# Patient Record
Sex: FEMALE | Race: WHITE | Employment: UNEMPLOYED | URBAN - METROPOLITAN AREA
[De-identification: names, ages, dates, MRNs, and addresses within clinical notes are randomized per-mention and may not be internally consistent; named-entity substitution may affect disease eponyms.]

---

## 2017-09-26 PROBLEM — Z80.0 FAMILY HISTORY OF COLON CANCER IN FATHER: Status: ACTIVE | Noted: 2017-09-26

## 2017-09-26 PROCEDURE — 86812 HLA TYPING A B OR C: CPT | Performed by: FAMILY MEDICINE

## 2017-09-26 PROCEDURE — 83002 ASSAY OF GONADOTROPIN (LH): CPT | Performed by: FAMILY MEDICINE

## 2017-09-26 PROCEDURE — 83001 ASSAY OF GONADOTROPIN (FSH): CPT | Performed by: FAMILY MEDICINE

## 2017-09-26 PROCEDURE — 82670 ASSAY OF TOTAL ESTRADIOL: CPT | Performed by: FAMILY MEDICINE

## 2017-10-30 PROBLEM — F17.200 SMOKER: Status: ACTIVE | Noted: 2017-10-30

## 2017-11-01 PROBLEM — Z72.0 TOBACCO USE: Status: ACTIVE | Noted: 2017-11-01

## 2017-11-01 PROBLEM — I50.30 DIASTOLIC HEART FAILURE, UNSPECIFIED HEART FAILURE CHRONICITY: Status: ACTIVE | Noted: 2017-11-01

## 2017-11-10 PROCEDURE — 87340 HEPATITIS B SURFACE AG IA: CPT | Performed by: INTERNAL MEDICINE

## 2017-11-10 PROCEDURE — 86803 HEPATITIS C AB TEST: CPT | Performed by: INTERNAL MEDICINE

## 2017-11-10 PROCEDURE — 86704 HEP B CORE ANTIBODY TOTAL: CPT | Performed by: INTERNAL MEDICINE

## 2017-11-10 PROCEDURE — 86160 COMPLEMENT ANTIGEN: CPT | Performed by: INTERNAL MEDICINE

## 2017-11-10 PROCEDURE — 86200 CCP ANTIBODY: CPT | Performed by: INTERNAL MEDICINE

## 2017-11-10 PROCEDURE — 36415 COLL VENOUS BLD VENIPUNCTURE: CPT | Performed by: INTERNAL MEDICINE

## 2018-05-14 ENCOUNTER — HOSPITAL ENCOUNTER (EMERGENCY)
Facility: HOSPITAL | Age: 48
Discharge: HOME | End: 2018-05-14
Payer: COMMERCIAL

## 2018-05-14 ENCOUNTER — APPOINTMENT (OUTPATIENT)
Dept: GENERAL RADIOLOGY | Facility: HOSPITAL | Age: 48
End: 2018-05-14
Payer: COMMERCIAL

## 2018-05-14 VITALS
OXYGEN SATURATION: 96 % | RESPIRATION RATE: 18 BRPM | BODY MASS INDEX: 28.35 KG/M2 | HEIGHT: 63 IN | WEIGHT: 160 LBS | HEART RATE: 83 BPM | DIASTOLIC BLOOD PRESSURE: 80 MMHG | SYSTOLIC BLOOD PRESSURE: 142 MMHG | TEMPERATURE: 100 F

## 2018-05-14 DIAGNOSIS — R11.2 NON-INTRACTABLE VOMITING WITH NAUSEA, UNSPECIFIED VOMITING TYPE: ICD-10-CM

## 2018-05-14 DIAGNOSIS — S70.01XA CONTUSION OF RIGHT HIP, INITIAL ENCOUNTER: ICD-10-CM

## 2018-05-14 DIAGNOSIS — K59.00 CONSTIPATION, UNSPECIFIED CONSTIPATION TYPE: Primary | ICD-10-CM

## 2018-05-14 PROCEDURE — 99283 EMERGENCY DEPT VISIT LOW MDM: CPT

## 2018-05-14 PROCEDURE — 73502 X-RAY EXAM HIP UNI 2-3 VIEWS: CPT

## 2018-05-14 RX ORDER — ONDANSETRON 4 MG/1
4 TABLET, ORALLY DISINTEGRATING ORAL EVERY 4 HOURS PRN
Qty: 20 TABLET | Refills: 0 | Status: SHIPPED | OUTPATIENT
Start: 2018-05-14 | End: 2019-05-24

## 2018-05-14 RX ORDER — ONDANSETRON 4 MG/1
4 TABLET, ORALLY DISINTEGRATING ORAL ONCE
Status: COMPLETED | OUTPATIENT
Start: 2018-05-14 | End: 2018-05-14

## 2018-05-14 NOTE — ED INITIAL ASSESSMENT (HPI)
Patient reports right hip pain since falling backward last week. Patient denies loss of consciousness or head injury. The patient is complaining of constipation for 3 days with diarrhea a few minutes ago and active nausea and vomiting.  Patient denies blood

## 2018-05-15 NOTE — ED PROVIDER NOTES
Patient Seen in: [facility] AND [facility] Emergency Department    History   Patient presents with:  Nausea/Vomiting/Diarrhea (gastrointestinal)  Pain (neurologic)      HPI    Patient presents to the ED complaining of right hip pain ever since falling last week. Cancer Other      m aunt hodgkins lymph  at 36 (dx at 28)   • Other [OTHER] Other      p aunt with ra or lupus   • No Known Problems Son    • Alcohol and Other Disorders Associated Sister    • No Known Problems Brother        Smoking Status: Social His Reviewed - No data to display      Imaging Results Available and Reviewed while in ED: Xr Hip W Or Wo Pelvis 2 Or 3 Views, Right (cpt=73502)    Result Date: 5/14/2018  CONCLUSION:   No radiographically visible acute osseous injury of the right hip.   Ancil Lower presents to the ED complaining of right hip pain after fall last week. Has been ambulatory despite the pain, x-ray unremarkable for acute bony injury. Likely hip contusion, discussed supportive care measures and PMD follow-up. Constipation ×3 days.   Eva Whiting

## 2018-05-15 NOTE — ED NOTES
Pt dcd to home with son, pt refuse to sign discharge paper but took the prescription and discharge instruction, discharge instruction given and voices understanding, prescription given, pt uses her cane on her way to ER lobby, NAD noted

## 2018-08-17 PROBLEM — I50.30 DIASTOLIC HEART FAILURE (HCC): Status: ACTIVE | Noted: 2017-11-01

## 2018-10-09 PROCEDURE — 88175 CYTOPATH C/V AUTO FLUID REDO: CPT | Performed by: FAMILY MEDICINE

## 2018-10-09 PROCEDURE — 87624 HPV HI-RISK TYP POOLED RSLT: CPT | Performed by: FAMILY MEDICINE

## 2018-10-12 PROCEDURE — 86200 CCP ANTIBODY: CPT | Performed by: INTERNAL MEDICINE

## 2019-09-05 PROBLEM — R76.8 RHEUMATOID FACTOR POSITIVE: Status: ACTIVE | Noted: 2019-09-05

## 2019-09-05 PROBLEM — M05.79 RHEUMATOID ARTHRITIS INVOLVING MULTIPLE SITES WITH POSITIVE RHEUMATOID FACTOR (HCC): Status: ACTIVE | Noted: 2019-09-05

## 2019-09-05 PROBLEM — R76.8 ANA POSITIVE: Status: ACTIVE | Noted: 2019-09-05

## 2019-11-19 PROBLEM — R76.8 POSITIVE ANA (ANTINUCLEAR ANTIBODY): Status: ACTIVE | Noted: 2019-09-05

## 2019-11-19 PROBLEM — M17.11 PRIMARY OSTEOARTHRITIS OF RIGHT KNEE: Status: ACTIVE | Noted: 2019-11-19

## 2020-06-24 ENCOUNTER — HOSPITAL ENCOUNTER (INPATIENT)
Facility: HOSPITAL | Age: 50
LOS: 6 days | Discharge: HOME | DRG: 391 | End: 2020-06-30
Attending: EMERGENCY MEDICINE | Admitting: HOSPITALIST
Payer: MEDICAID

## 2020-06-24 ENCOUNTER — APPOINTMENT (OUTPATIENT)
Dept: CT IMAGING | Facility: HOSPITAL | Age: 50
DRG: 391 | End: 2020-06-24
Attending: EMERGENCY MEDICINE
Payer: MEDICAID

## 2020-06-24 DIAGNOSIS — E87.1 HYPONATREMIA: ICD-10-CM

## 2020-06-24 DIAGNOSIS — R62.7 FAILURE TO THRIVE IN ADULT: ICD-10-CM

## 2020-06-24 DIAGNOSIS — E87.6 HYPOKALEMIA: Primary | ICD-10-CM

## 2020-06-24 DIAGNOSIS — N17.9 AKI (ACUTE KIDNEY INJURY) (HCC): ICD-10-CM

## 2020-06-24 LAB
ALBUMIN SERPL-MCNC: 3.2 G/DL (ref 3.4–5)
ALBUMIN/GLOB SERPL: 0.7 {RATIO} (ref 1–2)
ALP LIVER SERPL-CCNC: 63 U/L (ref 39–100)
ALT SERPL-CCNC: 50 U/L (ref 13–56)
ANION GAP SERPL CALC-SCNC: 11 MMOL/L (ref 0–18)
AST SERPL-CCNC: 43 U/L (ref 15–37)
BASOPHILS # BLD AUTO: 0.06 X10(3) UL (ref 0–0.2)
BASOPHILS NFR BLD AUTO: 1.1 %
BILIRUB SERPL-MCNC: 0.7 MG/DL (ref 0.1–2)
BUN BLD-MCNC: 52 MG/DL (ref 7–18)
BUN/CREAT SERPL: 20.9 (ref 10–20)
C DIFF TOX B STL QL: NEGATIVE
CALCIUM BLD-MCNC: 10.5 MG/DL (ref 8.5–10.1)
CHLORIDE SERPL-SCNC: 92 MMOL/L (ref 98–112)
CO2 SERPL-SCNC: 26 MMOL/L (ref 21–32)
CORTIS SERPL-MCNC: 45.2 UG/DL
CREAT BLD-MCNC: 2.49 MG/DL (ref 0.55–1.02)
CRP SERPL-MCNC: 1.4 MG/DL (ref ?–0.3)
DEPRECATED HBV CORE AB SER IA-ACNC: 572.2 NG/ML (ref 12–240)
DEPRECATED RDW RBC AUTO: 43.2 FL (ref 35.1–46.3)
EOSINOPHIL # BLD AUTO: 0.17 X10(3) UL (ref 0–0.7)
EOSINOPHIL NFR BLD AUTO: 3 %
ERYTHROCYTE [DISTWIDTH] IN BLOOD BY AUTOMATED COUNT: 14.2 % (ref 11–15)
GLOBULIN PLAS-MCNC: 4.3 G/DL (ref 2.8–4.4)
GLUCOSE BLD-MCNC: 126 MG/DL (ref 70–99)
HAV IGM SER QL: 2.6 MG/DL (ref 1.6–2.6)
HCT VFR BLD AUTO: 31.2 % (ref 35–48)
HGB BLD-MCNC: 10.7 G/DL (ref 12–16)
IMM GRANULOCYTES # BLD AUTO: 0.02 X10(3) UL (ref 0–1)
IMM GRANULOCYTES NFR BLD: 0.4 %
LDH SERPL L TO P-CCNC: 404 U/L
LYMPHOCYTES # BLD AUTO: 0.71 X10(3) UL (ref 1–4)
LYMPHOCYTES NFR BLD AUTO: 12.5 %
M PROTEIN MFR SERPL ELPH: 7.5 G/DL (ref 6.4–8.2)
MCH RBC QN AUTO: 28.9 PG (ref 26–34)
MCHC RBC AUTO-ENTMCNC: 34.3 G/DL (ref 31–37)
MCV RBC AUTO: 84.3 FL (ref 80–100)
MONOCYTES # BLD AUTO: 0.63 X10(3) UL (ref 0.1–1)
MONOCYTES NFR BLD AUTO: 11.1 %
NEUTROPHILS # BLD AUTO: 4.1 X10 (3) UL (ref 1.5–7.7)
NEUTROPHILS # BLD AUTO: 4.1 X10(3) UL (ref 1.5–7.7)
NEUTROPHILS NFR BLD AUTO: 71.9 %
OSMOLALITY SERPL CALC.SUM OF ELEC: 284 MOSM/KG (ref 275–295)
PLATELET # BLD AUTO: 273 10(3)UL (ref 150–450)
POTASSIUM SERPL-SCNC: 2.4 MMOL/L (ref 3.5–5.1)
PROCALCITONIN SERPL-MCNC: 0.36 NG/ML (ref ?–0.16)
RBC # BLD AUTO: 3.7 X10(6)UL (ref 3.8–5.3)
SARS-COV-2 RNA RESP QL NAA+PROBE: NOT DETECTED
SODIUM SERPL-SCNC: 129 MMOL/L (ref 136–145)
WBC # BLD AUTO: 5.7 X10(3) UL (ref 4–11)

## 2020-06-24 PROCEDURE — 74176 CT ABD & PELVIS W/O CONTRAST: CPT | Performed by: EMERGENCY MEDICINE

## 2020-06-24 PROCEDURE — 85025 COMPLETE CBC W/AUTO DIFF WBC: CPT | Performed by: EMERGENCY MEDICINE

## 2020-06-24 PROCEDURE — 96361 HYDRATE IV INFUSION ADD-ON: CPT

## 2020-06-24 PROCEDURE — 83735 ASSAY OF MAGNESIUM: CPT | Performed by: EMERGENCY MEDICINE

## 2020-06-24 PROCEDURE — 93010 ELECTROCARDIOGRAM REPORT: CPT | Performed by: EMERGENCY MEDICINE

## 2020-06-24 PROCEDURE — 87493 C DIFF AMPLIFIED PROBE: CPT | Performed by: HOSPITALIST

## 2020-06-24 PROCEDURE — 84145 PROCALCITONIN (PCT): CPT | Performed by: EMERGENCY MEDICINE

## 2020-06-24 PROCEDURE — 93005 ELECTROCARDIOGRAM TRACING: CPT

## 2020-06-24 PROCEDURE — 82533 TOTAL CORTISOL: CPT | Performed by: INTERNAL MEDICINE

## 2020-06-24 PROCEDURE — 99291 CRITICAL CARE FIRST HOUR: CPT

## 2020-06-24 PROCEDURE — 83615 LACTATE (LD) (LDH) ENZYME: CPT | Performed by: EMERGENCY MEDICINE

## 2020-06-24 PROCEDURE — 82728 ASSAY OF FERRITIN: CPT | Performed by: EMERGENCY MEDICINE

## 2020-06-24 PROCEDURE — 80053 COMPREHEN METABOLIC PANEL: CPT | Performed by: EMERGENCY MEDICINE

## 2020-06-24 PROCEDURE — 96375 TX/PRO/DX INJ NEW DRUG ADDON: CPT

## 2020-06-24 PROCEDURE — 96374 THER/PROPH/DIAG INJ IV PUSH: CPT

## 2020-06-24 PROCEDURE — 86140 C-REACTIVE PROTEIN: CPT | Performed by: EMERGENCY MEDICINE

## 2020-06-24 RX ORDER — DICYCLOMINE HYDROCHLORIDE 10 MG/1
10 CAPSULE ORAL 3 TIMES DAILY PRN
Status: DISCONTINUED | OUTPATIENT
Start: 2020-06-24 | End: 2020-06-30

## 2020-06-24 RX ORDER — MONTELUKAST SODIUM 10 MG/1
10 TABLET ORAL NIGHTLY
Status: DISCONTINUED | OUTPATIENT
Start: 2020-06-24 | End: 2020-06-30

## 2020-06-24 RX ORDER — ACETAMINOPHEN 325 MG/1
650 TABLET ORAL EVERY 6 HOURS PRN
Status: DISCONTINUED | OUTPATIENT
Start: 2020-06-24 | End: 2020-06-30

## 2020-06-24 RX ORDER — PANTOPRAZOLE SODIUM 40 MG/1
40 TABLET, DELAYED RELEASE ORAL
Status: DISCONTINUED | OUTPATIENT
Start: 2020-06-25 | End: 2020-06-26

## 2020-06-24 RX ORDER — BUPRENORPHINE HYDROCHLORIDE 8 MG/1
8 TABLET SUBLINGUAL 3 TIMES DAILY PRN
Status: DISCONTINUED | OUTPATIENT
Start: 2020-06-24 | End: 2020-06-30

## 2020-06-24 RX ORDER — SODIUM CHLORIDE 9 MG/ML
INJECTION, SOLUTION INTRAVENOUS CONTINUOUS
Status: DISCONTINUED | OUTPATIENT
Start: 2020-06-24 | End: 2020-06-27

## 2020-06-24 RX ORDER — ONDANSETRON 2 MG/ML
4 INJECTION INTRAMUSCULAR; INTRAVENOUS EVERY 6 HOURS PRN
Status: DISCONTINUED | OUTPATIENT
Start: 2020-06-24 | End: 2020-06-30

## 2020-06-24 RX ORDER — CYCLOBENZAPRINE HCL 5 MG
5 TABLET ORAL 3 TIMES DAILY PRN
Status: DISCONTINUED | OUTPATIENT
Start: 2020-06-24 | End: 2020-06-30

## 2020-06-24 RX ORDER — HEPARIN SODIUM 5000 [USP'U]/ML
5000 INJECTION, SOLUTION INTRAVENOUS; SUBCUTANEOUS EVERY 12 HOURS SCHEDULED
Status: DISCONTINUED | OUTPATIENT
Start: 2020-06-24 | End: 2020-06-30

## 2020-06-24 RX ORDER — CETIRIZINE HYDROCHLORIDE 5 MG/1
5 TABLET ORAL DAILY
Status: DISCONTINUED | OUTPATIENT
Start: 2020-06-24 | End: 2020-06-30

## 2020-06-24 RX ORDER — TRAZODONE HYDROCHLORIDE 50 MG/1
50 TABLET ORAL NIGHTLY PRN
Status: DISCONTINUED | OUTPATIENT
Start: 2020-06-24 | End: 2020-06-30

## 2020-06-24 RX ORDER — TOPIRAMATE 25 MG/1
25 TABLET ORAL DAILY
Status: DISCONTINUED | OUTPATIENT
Start: 2020-06-25 | End: 2020-06-30

## 2020-06-24 RX ORDER — POTASSIUM CHLORIDE 20 MEQ/1
40 TABLET, EXTENDED RELEASE ORAL ONCE
Status: COMPLETED | OUTPATIENT
Start: 2020-06-24 | End: 2020-06-24

## 2020-06-24 RX ORDER — ONDANSETRON 2 MG/ML
4 INJECTION INTRAMUSCULAR; INTRAVENOUS ONCE
Status: COMPLETED | OUTPATIENT
Start: 2020-06-24 | End: 2020-06-24

## 2020-06-24 RX ORDER — SODIUM CHLORIDE 9 MG/ML
INJECTION, SOLUTION INTRAVENOUS CONTINUOUS
Status: DISCONTINUED | OUTPATIENT
Start: 2020-06-24 | End: 2020-06-24

## 2020-06-24 RX ORDER — SODIUM CHLORIDE 0.9 % (FLUSH) 0.9 %
3 SYRINGE (ML) INJECTION AS NEEDED
Status: DISCONTINUED | OUTPATIENT
Start: 2020-06-24 | End: 2020-06-30

## 2020-06-24 RX ORDER — LEFLUNOMIDE 20 MG/1
20 TABLET ORAL DAILY
Status: DISCONTINUED | OUTPATIENT
Start: 2020-06-25 | End: 2020-06-29

## 2020-06-24 RX ORDER — METOCLOPRAMIDE HYDROCHLORIDE 5 MG/ML
5 INJECTION INTRAMUSCULAR; INTRAVENOUS EVERY 8 HOURS PRN
Status: DISCONTINUED | OUTPATIENT
Start: 2020-06-24 | End: 2020-06-30

## 2020-06-24 RX ORDER — ALBUTEROL SULFATE 90 UG/1
1 AEROSOL, METERED RESPIRATORY (INHALATION) EVERY 4 HOURS PRN
Status: DISCONTINUED | OUTPATIENT
Start: 2020-06-24 | End: 2020-06-30

## 2020-06-24 RX ORDER — FOLIC ACID 1 MG/1
2 TABLET ORAL DAILY
Status: DISCONTINUED | OUTPATIENT
Start: 2020-06-24 | End: 2020-06-30

## 2020-06-24 NOTE — H&P
[facility] Hospitalist H&P       CC: Patient presents with:  Diarrhea  Failure To Thrive       PCP: Mushtaq Carranza MD    Date of Admission: 6/24/2020 12:00 PM    ASSESSMENT / PLAN:       [facility] [place] is a 54 [facility] F with PMH of RA, HTN, HL, chronic back pain on s jaw muscles felt so weak.  No fevers/chills, or blood in stool      PMH  Past Medical History:   Diagnosis Date   • JOEY positive 9/5/2019   • ANXIETY    • BACK PAIN     chronic lower back pain/ mild stenosis cerv. levels and mild retrolisthesis of lumbar sp Obesity Mother    • Diabetes Mother    • Other (Other) Mother         respiratory distress   • Other (ALS) Mother 62   • Cancer Maternal Grandmother         brain   • Cancer Paternal Grandmother         br ca   • Cancer Other         m aunt hodgkins lymph tachycardia    Radiology: Ct Abdomen+pelvis(cpt=74176)    Result Date: 6/24/2020  CONCLUSION:  1. Gastric wall thickening involving the fundus of the stomach. Suspect nonspecific gastritis. 2. Nondistended fluid-filled loops of small and large bowel.   Fernando Begun

## 2020-06-24 NOTE — CONSULTS
REFERRING PHYSICIAN: Dr. Benson ref. provider found    HPI:         Thank you very much for requesting me to see the patient. Pt seen pm 6/24/20.      As you know, Olesya Franco is a 53year old female who presents today with 3 months of severe watery non-blood testing. - - - Assessment: 1. Diarrhea. Recent stool testing negative for C. Diff. Normal fecal fat testing.  Evaluate for possible giardia vs celiac disease vs IBD vs microscopic colitis vs bile acid diarrhea related to prior cholecystectomy vs functional Diagnosis Date   • JOEY positive 9/5/2019   • ANXIETY    • BACK PAIN     chronic lower back pain/ mild stenosis cerv. levels and mild retrolisthesis of lumbar spine   • DEPRESSION    • Fibromyalgia     [facility] pres[facility]ri 04/01/2018   SpO2 98%   BMI 24.04 kg/m²  -Body mass index is 24.04 kg/m².   GENERAL: chronically ill appearing pt, appers much older than stated age, in NAD  Evidence of wt loss, protein calorie malnut  SKIN: no rashes, no suspicious lesions  HEENT: anicter

## 2020-06-24 NOTE — H&P (VIEW-ONLY)
REFERRING PHYSICIAN: Dr. Benson ref. provider found    HPI:         Thank you very much for requesting me to see the patient. Pt seen pm 6/24/20.      As you know, Kristofer Ramos is a 53year old female who presents today with 3 months of severe watery non-blood testing. - - - Assessment: 1. Diarrhea. Recent stool testing negative for C. Diff. Normal fecal fat testing.  Evaluate for possible giardia vs celiac disease vs IBD vs microscopic colitis vs bile acid diarrhea related to prior cholecystectomy vs functional Diagnosis Date   • JOEY positive 9/5/2019   • ANXIETY    • BACK PAIN     chronic lower back pain/ mild stenosis cerv. levels and mild retrolisthesis of lumbar spine   • DEPRESSION    • Fibromyalgia     [facility]ri 04/01/2018   SpO2 98%   BMI 24.04 kg/m²  -Body mass index is 24.04 kg/m².   GENERAL: chronically ill appearing pt, appers much older than stated age, in NAD  Evidence of wt loss, protein calorie malnut  SKIN: no rashes, no suspicious lesions  HEENT: anicter

## 2020-06-24 NOTE — ED PROVIDER NOTES
Patient Seen in: [facility] AND [facility] Emergency Department      History   Patient presents with:  Diarrhea  Failure To Thrive    Stated Complaint: diarrhea/not eating     HPI    23-year-old female with past medical history significant for anxiety, chronic l Past Surgical History:   Procedure Laterality Date   •      • REMOVAL GALLBLADDER  14                    Social History    Tobacco Use      Smoking status: Current Every Day Smoker        Packs/day: 1.00        Years: 35.00        Pack years: Psychiatric:    ED Course     Labs Reviewed   COMP METABOLIC PANEL (14) - Abnormal; Notable for the following components:       Result Value    Glucose 126 (*)     Sodium 129 (*)     Potassium 2.4 (*)     Chloride 92 (*)     BUN 52 (*)     Creatinine 2.4 measuring 6 mm at the base of the heart anteriorly. 4. No hydroureteronephrosis or urinary calculi. 5. Marked S shaped scoliosis and superimposed advanced multilevel dorsal lumbar spondylosis.  6. Minimal bibasilar ground-glass alveolitis suggesting mild i

## 2020-06-25 LAB
ADENOVIRUS F 40/41 PCR: NEGATIVE
AMPHET UR QL SCN: NEGATIVE
ANION GAP SERPL CALC-SCNC: 6 MMOL/L (ref 0–18)
ASTROVIRUS PCR: NEGATIVE
BARBITURATES UR QL SCN: NEGATIVE
BASOPHILS # BLD AUTO: 0.05 X10(3) UL (ref 0–0.2)
BASOPHILS NFR BLD AUTO: 1.2 %
BENZODIAZ UR QL SCN: NEGATIVE
BUN BLD-MCNC: 35 MG/DL (ref 7–18)
BUN/CREAT SERPL: 24.6 (ref 10–20)
C CAYETANENSIS DNA SPEC QL NAA+PROBE: NEGATIVE
CALCIUM BLD-MCNC: 8.4 MG/DL (ref 8.5–10.1)
CAMPY SP DNA.DIARRHEA STL QL NAA+PROBE: NEGATIVE
CANNABINOIDS UR QL SCN: NEGATIVE
CHLORIDE SERPL-SCNC: 107 MMOL/L (ref 98–112)
CO2 SERPL-SCNC: 24 MMOL/L (ref 21–32)
COCAINE UR QL: NEGATIVE
CORTIS SERPL-MCNC: 15 UG/DL
CREAT BLD-MCNC: 1.42 MG/DL (ref 0.55–1.02)
CREAT UR-SCNC: 20.5 MG/DL
CRYPTOSP DNA SPEC QL NAA+PROBE: NEGATIVE
DEPRECATED RDW RBC AUTO: 44.1 FL (ref 35.1–46.3)
EAEC PAA PLAS AGGR+AATA ST NAA+NON-PRB: NEGATIVE
EC STX1+STX2 + H7 FLIC SPEC NAA+PROBE: NEGATIVE
ENTAMOEBA HISTOLYTICA PCR: NEGATIVE
EOSINOPHIL # BLD AUTO: 0.23 X10(3) UL (ref 0–0.7)
EOSINOPHIL NFR BLD AUTO: 5.5 %
EPEC EAE GENE STL QL NAA+NON-PROBE: NEGATIVE
ERYTHROCYTE [DISTWIDTH] IN BLOOD BY AUTOMATED COUNT: 14.4 % (ref 11–15)
ETEC LTA+ST1A+ST1B TOX ST NAA+NON-PROBE: NEGATIVE
GIARDIA LAMBLIA PCR: NEGATIVE
GLUCOSE BLD-MCNC: 78 MG/DL (ref 70–99)
HCT VFR BLD AUTO: 22.4 % (ref 35–48)
HGB BLD-MCNC: 7.6 G/DL (ref 12–16)
IMM GRANULOCYTES # BLD AUTO: 0.02 X10(3) UL (ref 0–1)
IMM GRANULOCYTES NFR BLD: 0.5 %
LYMPHOCYTES # BLD AUTO: 0.94 X10(3) UL (ref 1–4)
LYMPHOCYTES NFR BLD AUTO: 22.5 %
MCH RBC QN AUTO: 28.8 PG (ref 26–34)
MCHC RBC AUTO-ENTMCNC: 33.9 G/DL (ref 31–37)
MCV RBC AUTO: 84.8 FL (ref 80–100)
MDMA UR QL SCN: NEGATIVE
METHADONE UR QL SCN: NEGATIVE
MONOCYTES # BLD AUTO: 0.49 X10(3) UL (ref 0.1–1)
MONOCYTES NFR BLD AUTO: 11.7 %
NEUTROPHILS # BLD AUTO: 2.45 X10 (3) UL (ref 1.5–7.7)
NEUTROPHILS # BLD AUTO: 2.45 X10(3) UL (ref 1.5–7.7)
NEUTROPHILS NFR BLD AUTO: 58.6 %
NOROVIRUS GI/GII PCR: NEGATIVE
OPIATES UR QL SCN: NEGATIVE
OSMOLALITY SERPL CALC.SUM OF ELEC: 291 MOSM/KG (ref 275–295)
OXYCODONE UR QL SCN: NEGATIVE
P SHIGELLOIDES DNA STL QL NAA+PROBE: NEGATIVE
PCP UR QL SCN: NEGATIVE
PLATELET # BLD AUTO: 221 10(3)UL (ref 150–450)
POTASSIUM SERPL-SCNC: 2.9 MMOL/L (ref 3.5–5.1)
POTASSIUM SERPL-SCNC: 3.7 MMOL/L (ref 3.5–5.1)
RBC # BLD AUTO: 2.64 X10(6)UL (ref 3.8–5.3)
ROTAVIRUS A PCR: NEGATIVE
SALMONELLA DNA SPEC QL NAA+PROBE: NEGATIVE
SAPOVIRUS PCR: NEGATIVE
SHIGELLA SP+EIEC IPAH ST NAA+NON-PROBE: NEGATIVE
SODIUM SERPL-SCNC: 137 MMOL/L (ref 136–145)
TSI SER-ACNC: 0.55 MIU/ML (ref 0.36–3.74)
V CHOLERAE DNA SPEC QL NAA+PROBE: NEGATIVE
VIBRIO DNA SPEC NAA+PROBE: NEGATIVE
WBC # BLD AUTO: 4.2 X10(3) UL (ref 4–11)
YERSINIA DNA SPEC NAA+PROBE: NEGATIVE

## 2020-06-25 PROCEDURE — 80307 DRUG TEST PRSMV CHEM ANLYZR: CPT | Performed by: INTERNAL MEDICINE

## 2020-06-25 PROCEDURE — 82941 ASSAY OF GASTRIN: CPT | Performed by: INTERNAL MEDICINE

## 2020-06-25 PROCEDURE — 84443 ASSAY THYROID STIM HORMONE: CPT | Performed by: INTERNAL MEDICINE

## 2020-06-25 PROCEDURE — 82533 TOTAL CORTISOL: CPT | Performed by: INTERNAL MEDICINE

## 2020-06-25 PROCEDURE — 80048 BASIC METABOLIC PNL TOTAL CA: CPT | Performed by: HOSPITALIST

## 2020-06-25 PROCEDURE — 85025 COMPLETE CBC W/AUTO DIFF WBC: CPT | Performed by: HOSPITALIST

## 2020-06-25 PROCEDURE — 84132 ASSAY OF SERUM POTASSIUM: CPT | Performed by: HOSPITALIST

## 2020-06-25 PROCEDURE — 87507 IADNA-DNA/RNA PROBE TQ 12-25: CPT | Performed by: HOSPITALIST

## 2020-06-25 RX ORDER — POTASSIUM CHLORIDE 20 MEQ/1
40 TABLET, EXTENDED RELEASE ORAL EVERY 4 HOURS
Status: COMPLETED | OUTPATIENT
Start: 2020-06-25 | End: 2020-06-25

## 2020-06-25 RX ORDER — BUSPIRONE HYDROCHLORIDE 15 MG/1
15 TABLET ORAL 3 TIMES DAILY
Status: DISCONTINUED | OUTPATIENT
Start: 2020-06-25 | End: 2020-06-30

## 2020-06-25 RX ORDER — DIPHENHYDRAMINE HCL 25 MG
25 CAPSULE ORAL EVERY 6 HOURS PRN
Status: DISCONTINUED | OUTPATIENT
Start: 2020-06-25 | End: 2020-06-30

## 2020-06-25 RX ORDER — IBUPROFEN 200 MG
CAPSULE ORAL 2 TIMES DAILY
Status: DISCONTINUED | OUTPATIENT
Start: 2020-06-25 | End: 2020-06-30

## 2020-06-25 RX ORDER — MAGNESIUM CARB/ALUMINUM HYDROX 105-160MG
296 TABLET,CHEWABLE ORAL ONCE
Status: DISCONTINUED | OUTPATIENT
Start: 2020-06-26 | End: 2020-06-26

## 2020-06-25 RX ORDER — MAGNESIUM CARB/ALUMINUM HYDROX 105-160MG
296 TABLET,CHEWABLE ORAL ONCE
Status: DISCONTINUED | OUTPATIENT
Start: 2020-06-26 | End: 2020-06-25

## 2020-06-25 RX ORDER — DULOXETIN HYDROCHLORIDE 30 MG/1
60 CAPSULE, DELAYED RELEASE ORAL 2 TIMES DAILY
Status: DISCONTINUED | OUTPATIENT
Start: 2020-06-25 | End: 2020-06-30

## 2020-06-25 RX ORDER — POTASSIUM CHLORIDE 20 MEQ/1
40 TABLET, EXTENDED RELEASE ORAL ONCE
Status: COMPLETED | OUTPATIENT
Start: 2020-06-25 | End: 2020-06-25

## 2020-06-25 NOTE — PAYOR COMM NOTE
--------------  ADMISSION REVIEW     Payor: Mani Morris #:  [id number]  Authorization Number: [id number]    Admit date: 6/24/20  Admit time: [address] [place]       Admitting Physician: Deette Sacks, MD  Attending Physicia been having diarrhea chronically for the past 3 months. Initially she was having approximately 25 bowel movements a day but now she is typically having about 10. She denies any fevers or chills. She denies significant abdominal pain.   She states she int for stated complaint: diarrhea/not eating   Other systems are as noted in HPI. Constitutional and vital signs reviewed. All other systems reviewed and negative except as noted above.     Physical Exam     ED Triage Vitals   BP 06/24/20 1207 (!) 81/65 following components:    RBC 3.70 (*)     HGB 10.7 (*)     HCT 31.2 (*)     Lymphocyte Absolute 0.71 (*)     All other components within normal limits   MAGNESIUM - Normal   CBC WITH DIFFERENTIAL WITH PLATELET    Narrative:      The following orders were cr shows evidence of gastritis without any other significant abnormalities. Discussed with [facility] gastroenterology and hospitalist.  Patient has received 2 L of saline as well as potassium replacement.   Patient is comfortable with admission for further manageme N/V/diarrhea  - 40 lb weight loss, appears very thin  - GI consulted, was supposed to have colonoscopy tomorrow, consider inpatient scopes  - ESR/CRP pendnig    Severe hypokalemia  - K 2.4 in ED replete  - repeat K this evening  - continue IVF    ANGELA  - Cr • OTHER DISEASES     bronchitis   • OTHER DISEASES     ovarian cysts   • OTHER DISEASES     seen by psychiatrist   • OTHER DISEASES     flat feet- has seen pod   • OTHER DISEASES     fibromyalgia   • OTHER DISEASES     trochanteric bursitis.  s/p injectio Systems  Comprehensive ROS reviewed and negative except for what's stated above.      OBJECTIVE:  BP (!) 85/54   Pulse 92   Temp 97.2 °F (36.2 °C) (Temporal)   Resp 15   Ht 4' 10\" (1.473 m)   Wt 115 lb (52.2 kg)   LMP 04/01/2018   SpO2 98%   BMI 24.04 kg/m urinary calculi. 5. Marked S shaped scoliosis and superimposed advanced multilevel dorsal lumbar spondylosis. 6. Minimal bibasilar ground-glass alveolitis suggesting mild inflammatory pneumonitis versus atypical viral pneumonia.      Dictated by (CST): [employer] mg     Date Action Dose Route User    6/24/2020 2018 Given 10 mg Oral Abbie Mishra      ondansetron HCl [facility]) injection 4 mg     Date Action Dose Route User    6/24/2020 1355 Given 4 mg Intravenous Angelica Weiner, RN      Pantoprazole Sodium (PROTON

## 2020-06-25 NOTE — PROGRESS NOTES
[facility] Hospitalist Progress Note     CC: Hospital Follow up    PCP: Sandhya Workman MD       Assessment/Plan:     Principal Problem:    Hypokalemia  Active Problems:    ANGELA (acute kidney injury) ([facility] [address]    Failure to thrive in adult    Hyponatremia  Ms. Bolden 1325  Last data filed at 6/25/2020 1300  Gross per 24 hour   Intake 2351.67 ml   Output 1950 ml   Net 401.67 ml       Last 3 Weights  06/25/20 0626 : 119 lb 9.6 oz (54.3 kg)  06/24/20 1203 : 115 lb (52.2 kg)  06/10/20 1040 : 120 lb (54.4 kg)  06/01/20 1311 (CST): Susanna Weldon MD on 6/24/2020 at 2:45 PM              Meds:     • Potassium Chloride ER  40 mEq Oral Q4H   • busPIRone HCl  15 mg Oral TID   • DULoxetine HCl  60 mg Oral BID   • Heparin Sodium (Porcine)  5,000 Units Subcutaneous 2 times per da

## 2020-06-25 NOTE — PLAN OF CARE
Frequently using bathroom with diarrhea but good urine output. Dr. Liliana Campo gave verbal orders for immodium if patient's cdiff sample ome back negative. Tox screen sent. .9 @ 100. GI to see in am. Possible colonoscopy.   Problem: [place] diet as tolerated, if ordered  - Obtain nutritional consult as needed  - Evaluate fluid balance  Outcome: Progressing  Goal: Maintains or returns to baseline bowel function  Description  INTERVENTIONS:  - Assess bowel function  - Maintain adequate

## 2020-06-25 NOTE — PLAN OF CARE
Problem: CARDIOVASCULAR - ADULT  Goal: Maintains optimal cardiac output and hemodynamic stability  Description  INTERVENTIONS:  - Monitor vital signs, rhythm, and trends  - Monitor for bleeding, hypotension and signs of decreased cardiac output  - Evalua Assess bowel function  - Maintain adequate hydration with IV or PO as ordered and tolerated  - Evaluate effectiveness of GI medications  - Encourage mobilization and activity  - Obtain nutritional consult as needed  - Establish a toileting routine/schedule

## 2020-06-25 NOTE — PROGRESS NOTES
GI  PROGRESS NOTE    SUBJECTIVE: diarrhea persists.  Tolerating full liq    OBJECTIVE:  Temp:  [97.9 °F (36.6 °C)-99.5 °F (37.5 °C)] 99.5 °F (37.5 °C)  Pulse:  [] 138  Resp:  [14-18] 16  BP: ()/(53-72) 106/72  Exam  Gen: No acute distress, thor \"years\"!). (+) tobacco use. Recent celiac serology, iron, iron sat, b12/folate level normal. Family history of colon cancer. c diff negative.      DDX: microscopic colitis (related chronic NSAID use), IBD, colonic/UGI malignancy, small bowel pathology .

## 2020-06-25 NOTE — PROGRESS NOTES
[facility] Pharmacy Note:  Renal Dose Adjustment for Cetirizine (ZYRTEC)    Eduar Warren has been prescribed Cetirizine (Zyrtec) 10 mg orally daily. Estimated Creatinine Clearance: 22.5 mL/min (A) (based on SCr of 2.49 mg/dL (H)).     Her calculated creatinine

## 2020-06-26 ENCOUNTER — ANESTHESIA EVENT (OUTPATIENT)
Dept: ENDOSCOPY | Facility: HOSPITAL | Age: 50
DRG: 391 | End: 2020-06-26
Payer: MEDICAID

## 2020-06-26 ENCOUNTER — ANESTHESIA (OUTPATIENT)
Dept: ENDOSCOPY | Facility: HOSPITAL | Age: 50
DRG: 391 | End: 2020-06-26
Payer: MEDICAID

## 2020-06-26 LAB
ANION GAP SERPL CALC-SCNC: 4 MMOL/L (ref 0–18)
ANTIBODY SCREEN: NEGATIVE
BASOPHILS # BLD AUTO: 0.05 X10(3) UL (ref 0–0.2)
BASOPHILS NFR BLD AUTO: 1.4 %
BUN BLD-MCNC: 21 MG/DL (ref 7–18)
BUN/CREAT SERPL: 20.4 (ref 10–20)
CALCIUM BLD-MCNC: 8.3 MG/DL (ref 8.5–10.1)
CHLORIDE SERPL-SCNC: 114 MMOL/L (ref 98–112)
CO2 SERPL-SCNC: 23 MMOL/L (ref 21–32)
CREAT BLD-MCNC: 1.03 MG/DL (ref 0.55–1.02)
DEPRECATED HBV CORE AB SER IA-ACNC: 382.6 NG/ML (ref 12–240)
DEPRECATED RDW RBC AUTO: 46.7 FL (ref 35.1–46.3)
DEPRECATED RDW RBC AUTO: 47.4 FL (ref 35.1–46.3)
EOSINOPHIL # BLD AUTO: 0.21 X10(3) UL (ref 0–0.7)
EOSINOPHIL NFR BLD AUTO: 5.7 %
ERYTHROCYTE [DISTWIDTH] IN BLOOD BY AUTOMATED COUNT: 14.8 % (ref 11–15)
ERYTHROCYTE [DISTWIDTH] IN BLOOD BY AUTOMATED COUNT: 14.9 % (ref 11–15)
GLUCOSE BLD-MCNC: 70 MG/DL (ref 70–99)
HAV IGM SER QL: 1.7 MG/DL (ref 1.6–2.6)
HCT VFR BLD AUTO: 19.7 % (ref 35–48)
HCT VFR BLD AUTO: 20.6 % (ref 35–48)
HCT VFR BLD AUTO: 26.5 % (ref 35–48)
HGB BLD-MCNC: 6.5 G/DL (ref 12–16)
HGB BLD-MCNC: 6.9 G/DL (ref 12–16)
HGB BLD-MCNC: 8.8 G/DL (ref 12–16)
IMM GRANULOCYTES # BLD AUTO: 0.01 X10(3) UL (ref 0–1)
IMM GRANULOCYTES NFR BLD: 0.3 %
IRON SATURATION: 77 % (ref 15–50)
IRON SERPL-MCNC: 175 UG/DL (ref 50–170)
LYMPHOCYTES # BLD AUTO: 0.92 X10(3) UL (ref 1–4)
LYMPHOCYTES NFR BLD AUTO: 25.1 %
MCH RBC QN AUTO: 28.9 PG (ref 26–34)
MCH RBC QN AUTO: 29.4 PG (ref 26–34)
MCHC RBC AUTO-ENTMCNC: 33 G/DL (ref 31–37)
MCHC RBC AUTO-ENTMCNC: 33.5 G/DL (ref 31–37)
MCV RBC AUTO: 87.6 FL (ref 80–100)
MCV RBC AUTO: 87.7 FL (ref 80–100)
MONOCYTES # BLD AUTO: 0.51 X10(3) UL (ref 0.1–1)
MONOCYTES NFR BLD AUTO: 13.9 %
NEUTROPHILS # BLD AUTO: 1.97 X10 (3) UL (ref 1.5–7.7)
NEUTROPHILS # BLD AUTO: 1.97 X10(3) UL (ref 1.5–7.7)
NEUTROPHILS NFR BLD AUTO: 53.6 %
OSMOLALITY SERPL CALC.SUM OF ELEC: 293 MOSM/KG (ref 275–295)
PLATELET # BLD AUTO: 215 10(3)UL (ref 150–450)
PLATELET # BLD AUTO: 223 10(3)UL (ref 150–450)
POTASSIUM SERPL-SCNC: 4.5 MMOL/L (ref 3.5–5.1)
RBC # BLD AUTO: 2.25 X10(6)UL (ref 3.8–5.3)
RBC # BLD AUTO: 2.35 X10(6)UL (ref 3.8–5.3)
RH BLOOD TYPE: POSITIVE
SODIUM SERPL-SCNC: 141 MMOL/L (ref 136–145)
TOTAL IRON BINDING CAPACITY: 228 UG/DL (ref 240–450)
TRANSFERRIN SERPL-MCNC: 153 MG/DL (ref 200–360)
TRANSFERRIN SERPL-MCNC: 153 MG/DL (ref 200–360)
WBC # BLD AUTO: 3.7 X10(3) UL (ref 4–11)
WBC # BLD AUTO: 3.8 X10(3) UL (ref 4–11)

## 2020-06-26 PROCEDURE — 85027 COMPLETE CBC AUTOMATED: CPT | Performed by: INTERNAL MEDICINE

## 2020-06-26 PROCEDURE — 85014 HEMATOCRIT: CPT | Performed by: HOSPITALIST

## 2020-06-26 PROCEDURE — 86920 COMPATIBILITY TEST SPIN: CPT

## 2020-06-26 PROCEDURE — 83540 ASSAY OF IRON: CPT | Performed by: HOSPITALIST

## 2020-06-26 PROCEDURE — 86901 BLOOD TYPING SEROLOGIC RH(D): CPT | Performed by: INTERNAL MEDICINE

## 2020-06-26 PROCEDURE — 0DB48ZX EXCISION OF ESOPHAGOGASTRIC JUNCTION, VIA NATURAL OR ARTIFICIAL OPENING ENDOSCOPIC, DIAGNOSTIC: ICD-10-PCS | Performed by: INTERNAL MEDICINE

## 2020-06-26 PROCEDURE — 80048 BASIC METABOLIC PNL TOTAL CA: CPT | Performed by: HOSPITALIST

## 2020-06-26 PROCEDURE — 36430 TRANSFUSION BLD/BLD COMPNT: CPT

## 2020-06-26 PROCEDURE — C9113 INJ PANTOPRAZOLE SODIUM, VIA: HCPCS | Performed by: INTERNAL MEDICINE

## 2020-06-26 PROCEDURE — 0DBN8ZZ EXCISION OF SIGMOID COLON, VIA NATURAL OR ARTIFICIAL OPENING ENDOSCOPIC: ICD-10-PCS | Performed by: INTERNAL MEDICINE

## 2020-06-26 PROCEDURE — 0DB98ZX EXCISION OF DUODENUM, VIA NATURAL OR ARTIFICIAL OPENING ENDOSCOPIC, DIAGNOSTIC: ICD-10-PCS | Performed by: INTERNAL MEDICINE

## 2020-06-26 PROCEDURE — 88305 TISSUE EXAM BY PATHOLOGIST: CPT | Performed by: INTERNAL MEDICINE

## 2020-06-26 PROCEDURE — 0DBG8ZZ EXCISION OF LEFT LARGE INTESTINE, VIA NATURAL OR ARTIFICIAL OPENING ENDOSCOPIC: ICD-10-PCS | Performed by: INTERNAL MEDICINE

## 2020-06-26 PROCEDURE — 85025 COMPLETE CBC W/AUTO DIFF WBC: CPT | Performed by: HOSPITALIST

## 2020-06-26 PROCEDURE — 88312 SPECIAL STAINS GROUP 1: CPT | Performed by: INTERNAL MEDICINE

## 2020-06-26 PROCEDURE — 0DBP8ZZ EXCISION OF RECTUM, VIA NATURAL OR ARTIFICIAL OPENING ENDOSCOPIC: ICD-10-PCS | Performed by: INTERNAL MEDICINE

## 2020-06-26 PROCEDURE — 86900 BLOOD TYPING SEROLOGIC ABO: CPT | Performed by: INTERNAL MEDICINE

## 2020-06-26 PROCEDURE — 84466 ASSAY OF TRANSFERRIN: CPT | Performed by: HOSPITALIST

## 2020-06-26 PROCEDURE — 82728 ASSAY OF FERRITIN: CPT | Performed by: HOSPITALIST

## 2020-06-26 PROCEDURE — 86850 RBC ANTIBODY SCREEN: CPT | Performed by: INTERNAL MEDICINE

## 2020-06-26 PROCEDURE — 85060 BLOOD SMEAR INTERPRETATION: CPT | Performed by: HOSPITALIST

## 2020-06-26 PROCEDURE — 85018 HEMOGLOBIN: CPT | Performed by: HOSPITALIST

## 2020-06-26 PROCEDURE — 83735 ASSAY OF MAGNESIUM: CPT | Performed by: HOSPITALIST

## 2020-06-26 PROCEDURE — 0DB68ZX EXCISION OF STOMACH, VIA NATURAL OR ARTIFICIAL OPENING ENDOSCOPIC, DIAGNOSTIC: ICD-10-PCS | Performed by: INTERNAL MEDICINE

## 2020-06-26 RX ORDER — SODIUM CHLORIDE 9 MG/ML
INJECTION, SOLUTION INTRAVENOUS ONCE
Status: DISCONTINUED | OUTPATIENT
Start: 2020-06-26 | End: 2020-06-26

## 2020-06-26 RX ORDER — SODIUM CHLORIDE, SODIUM LACTATE, POTASSIUM CHLORIDE, CALCIUM CHLORIDE 600; 310; 30; 20 MG/100ML; MG/100ML; MG/100ML; MG/100ML
INJECTION, SOLUTION INTRAVENOUS CONTINUOUS
Status: DISCONTINUED | OUTPATIENT
Start: 2020-06-26 | End: 2020-06-27

## 2020-06-26 RX ORDER — NALOXONE HYDROCHLORIDE 0.4 MG/ML
80 INJECTION, SOLUTION INTRAMUSCULAR; INTRAVENOUS; SUBCUTANEOUS AS NEEDED
Status: DISCONTINUED | OUTPATIENT
Start: 2020-06-26 | End: 2020-06-27

## 2020-06-26 RX ORDER — PREDNISONE 20 MG/1
40 TABLET ORAL DAILY
Status: DISCONTINUED | OUTPATIENT
Start: 2020-06-26 | End: 2020-06-30

## 2020-06-26 RX ORDER — DIPHENHYDRAMINE HCL 25 MG
25 CAPSULE ORAL ONCE
Status: COMPLETED | OUTPATIENT
Start: 2020-06-26 | End: 2020-06-26

## 2020-06-26 RX ORDER — LIDOCAINE HYDROCHLORIDE 10 MG/ML
INJECTION, SOLUTION EPIDURAL; INFILTRATION; INTRACAUDAL; PERINEURAL AS NEEDED
Status: DISCONTINUED | OUTPATIENT
Start: 2020-06-26 | End: 2020-06-26 | Stop reason: SURG

## 2020-06-26 RX ORDER — ESMOLOL HYDROCHLORIDE 10 MG/ML
INJECTION INTRAVENOUS AS NEEDED
Status: DISCONTINUED | OUTPATIENT
Start: 2020-06-26 | End: 2020-06-26 | Stop reason: SURG

## 2020-06-26 RX ORDER — ACETAMINOPHEN 325 MG/1
650 TABLET ORAL ONCE
Status: COMPLETED | OUTPATIENT
Start: 2020-06-26 | End: 2020-06-26

## 2020-06-26 RX ORDER — MIDAZOLAM HYDROCHLORIDE 1 MG/ML
INJECTION INTRAMUSCULAR; INTRAVENOUS AS NEEDED
Status: DISCONTINUED | OUTPATIENT
Start: 2020-06-26 | End: 2020-06-26 | Stop reason: SURG

## 2020-06-26 RX ORDER — MAGNESIUM OXIDE 400 MG (241.3 MG MAGNESIUM) TABLET
400 TABLET ONCE
Status: COMPLETED | OUTPATIENT
Start: 2020-06-26 | End: 2020-06-26

## 2020-06-26 RX ADMIN — LIDOCAINE HYDROCHLORIDE 30 MG: 10 INJECTION, SOLUTION EPIDURAL; INFILTRATION; INTRACAUDAL; PERINEURAL at 14:38:00

## 2020-06-26 RX ADMIN — SODIUM CHLORIDE: 9 INJECTION, SOLUTION INTRAVENOUS at 15:18:00

## 2020-06-26 RX ADMIN — MIDAZOLAM HYDROCHLORIDE 2 MG: 1 INJECTION INTRAMUSCULAR; INTRAVENOUS at 14:36:00

## 2020-06-26 RX ADMIN — ESMOLOL HYDROCHLORIDE 10 MG: 10 INJECTION INTRAVENOUS at 14:54:00

## 2020-06-26 NOTE — PAYOR COMM NOTE
--------------  CONTINUED STAY REVIEW    Payor: Mani Morris #:  [id number]  Authorization Number: [id number]    Admit date: 6/24/20  Admit time: [address] [place]    Admitting Physician: Jay Gonzalez MD  Attending Physici 6/25/2020 1214 Given 60 mg Oral Gisell CEDRIC Hernández      Heparin Sodium (Porcine) 5000 UNIT/ML injection 5,000 Units     Date Action Dose Route User    6/25/2020 2150 Given 5000 Units Subcutaneous (Left Upper Abdomen) CEDRIC Graham

## 2020-06-26 NOTE — ANESTHESIA POSTPROCEDURE EVALUATION
Patient: Brooke Sutherland    Procedure Summary     Date:  06/26/20 Room / Location:  [facility] ENDOSCOPY 05 / [facility] ENDOSCOPY    Anesthesia Start:  7778 Anesthesia Stop:  8569    Procedures:       COLONOSCOPY (N/A )      ESOPHAGOGASTRODUODENOSCOPY (EGD) (N/A ) Liliya Mai

## 2020-06-26 NOTE — OPERATIVE REPORT
ENDOSCOPY OPERATIVE REPORT  Patient Name: Konrad Whitman  Medical Record #: [id number]  YOB: 1970  Date of Procedure: 6/26/2020    Preoperative Diagnosis: severe diarrhea; weight loss. History of chronic NSAID use.    Postoperative Diagnosis: throughout the procedures. The patient tolerated the procedures well.    Aronchick Bowel Prep:  Score:  1 = Excellent (>95% mucosa seen)   2 = Good (clear liquid up to 25% of mucosa, 90% mucosa seen)   3 = fair (semisolid stool not suctioned, but 90% mucosa

## 2020-06-26 NOTE — ANESTHESIA PREPROCEDURE EVALUATION
Anesthesia PreOp Note    HPI:     Kristofer Ramos is a 53year old female who presents for preoperative consultation requested by: Aleixs Leon MD    Date of Surgery: 6/24/2020 - 6/26/2020    Procedure(s):  COLONOSCOPY  ESOPHAGOGASTRODUODENOSCOPY (EGD)  Janice Date Noted: 03/11/2009      Unspecified vitamin D deficiency         Date Noted: 02/17/2009      Depressive disorder, not elsewhere classified         Date Noted: 02/03/2009      Other malaise and fatigue         Date Noted: 02/03/2009      Opioid t Dicyclomine HCl 10 MG Oral Cap, Take 1 capsule (10 mg total) by mouth 3 (three) times daily as needed. , Disp: 270 capsule, Rfl: 0, 6/23/2020 at Unknown time  LEFLUNOMIDE 20 MG Oral Tab, TAKE 1 TABLET(20 MG) BY MOUTH DAILY, Disp: 90 tablet, Rfl: 0, 6/23/202 Albuterol Sulfate HFA (VENTOLIN HFA) 108 (90 Base) MCG/ACT Inhalation Aero Soln, INHALE 2 PUFFS INTO THE LUNGS EVERY 4 HOURS AS NEEDED FOR WHEEZING, Disp: 1 Inhaler, Rfl: 3, Past Week at Unknown time  folic acid 1 MG Oral Tab, Take 2 tablets (2 mg total) b 0.9% NaCl infusion, , Intravenous, Continuous, Liya Rocha MD, Last Rate: 50 mL/hr at 06/26/20 1229  Heparin Sodium (Porcine) 5000 UNIT/ML injection 5,000 Units, 5,000 Units, Subcutaneous, 2 times per day, Kj Nevarez MD, Stopped at 06/26/20 0900  ac allergy.  It just does not work    Family History   Problem Relation Age of Onset   • Cancer Father         colon and prostate-  at 61   • Psychiatric Mother         depressed   • Obesity Mother    • Diabetes Mother    • Other (Other) Mother Attends meetings of clubs or organizations: Not on file        Relationship status: Not on file      Intimate partner violence:        Fear of current or ex partner: Not on file        Emotionally abused: Not on file        Physically abused: Not on Airway   Mallampati: III  TM distance: >3 FB  Neck ROM: full  Dental          Pulmonary - normal exam    breath sounds clear to auscultation    ROS comment: smoker  Cardiovascular - normal exam  Exercise tolerance: poor  (+) hypertension well controlled,

## 2020-06-26 NOTE — PROGRESS NOTES
[facility] Hospitalist Progress Note     CC: Hospital Follow up    PCP: Rick Ch MD       Assessment/Plan:     Principal Problem:    Hypokalemia  Active Problems:    ANGELA (acute kidney injury) ([facility] [address]    Failure to thrive in adult    Hyponatremia  Ms. Bolden °C)-99.5 °F (37.5 °C)] 98 °F (36.7 °C)  Pulse:  [] 80  Resp:  [16-18] 17  BP: ()/(65-72) 98/65      Intake/Output:    Intake/Output Summary (Last 24 hours) at 6/26/2020 1158  Last data filed at 6/26/2020 1004  Gross per 24 hour   Intake 3381.67 suggesting nonspecific enterocolitis. Nonvisualized appendix. 3. Trace pericardial effusion measuring 6 mm at the base of the heart anteriorly. 4. No hydroureteronephrosis or urinary calculi.  5. Marked S shaped scoliosis and superimposed advanced multile

## 2020-06-26 NOTE — INTERVAL H&P NOTE
Pre-op Diagnosis: Diarrhea, weight loss    The above referenced H&P was reviewed by Agustin Etienne MD on 6/26/2020, the patient was examined and no significant changes have occurred in the patient's condition since the H&P was performed.   I discussed with the

## 2020-06-26 NOTE — PLAN OF CARE
Problem: CARDIOVASCULAR - ADULT  Goal: Maintains optimal cardiac output and hemodynamic stability  Description  INTERVENTIONS:  - Monitor vital signs, rhythm, and trends  - Monitor for bleeding, hypotension and signs of decreased cardiac output  - Evalua Assess bowel function  - Maintain adequate hydration with IV or PO as ordered and tolerated  - Evaluate effectiveness of GI medications  - Encourage mobilization and activity  - Obtain nutritional consult as needed  - Establish a toileting routine/schedule to her the situation. She ordered for patient to be NPO now and to monitor her status. Labs to be drawn in the AM. Relayed info to Dr. Radha Hobson, order to dc mag citrate and Protonix po switched to IV. VSS. Will continue to monitor.      Addendum: patient had ano

## 2020-06-26 NOTE — DIETARY NOTE
ADULT NUTRITION INITIAL ASSESSMENT    Pt is at high nutrition risk. Pt meets severe malnutrition criteria.       CRITERIA FOR MALNUTRITION DIAGNOSIS:  Criteria for severe malnutrition diagnosis: chronic illness related to wt loss greater than 10% in 6 hossein needs    - Coordination of nutrition care: collaboration with other providers  - Discharge and transfer of nutrition care to new setting or provider: to be determined    ADMITTING DIAGNOSIS:   Hypokalemia [E87.6]  Hyponatremia [E87.1]  Failure to thrive in diarrhea    FOOD/NUTRITION RELATED HISTORY:  Appetite: Poor  Intake: 0%  Intake Meeting Needs: NPO and ONS ordered for when PO initiated  Percent Meals Eaten (last 3 days)     Date/Time Percent Meals Eaten (%)    06/26/20 1006  0 %    Percent Meals Eaten ( initiation and for PO diet advancement.   - Food and Nutrient Administration:      Monitor: need for temporary alternate nutrition  - Anthropometric Measurement:      Monitor weight  - Nutrition Goals:      allow wt loss due to fluid losses, maintain wt wit

## 2020-06-26 NOTE — PLAN OF CARE
Problem: CARDIOVASCULAR - ADULT  Goal: Maintains optimal cardiac output and hemodynamic stability  Description  INTERVENTIONS:  - Monitor vital signs, rhythm, and trends  - Monitor for bleeding, hypotension and signs of decreased cardiac output  - Evalua prescribed range  Description  INTERVENTIONS:  - Monitor Blood Glucose as ordered  - Assess for signs and symptoms of hyperglycemia and hypoglycemia  - Administer ordered medications to maintain glucose within target range  - Assess barriers to adequate nu

## 2020-06-27 LAB
ALBUMIN SERPL-MCNC: 2.4 G/DL (ref 3.4–5)
ALBUMIN/GLOB SERPL: 0.8 {RATIO} (ref 1–2)
ALP LIVER SERPL-CCNC: 46 U/L (ref 39–100)
ALT SERPL-CCNC: 51 U/L (ref 13–56)
ANION GAP SERPL CALC-SCNC: 4 MMOL/L (ref 0–18)
AST SERPL-CCNC: 27 U/L (ref 15–37)
BASOPHILS # BLD AUTO: 0.04 X10(3) UL (ref 0–0.2)
BASOPHILS NFR BLD AUTO: 0.8 %
BILIRUB SERPL-MCNC: 0.4 MG/DL (ref 0.1–2)
BLOOD TYPE BARCODE: 5100
BUN BLD-MCNC: 15 MG/DL (ref 7–18)
BUN/CREAT SERPL: 14.9 (ref 10–20)
CALCIUM BLD-MCNC: 8.1 MG/DL (ref 8.5–10.1)
CHLORIDE SERPL-SCNC: 112 MMOL/L (ref 98–112)
CO2 SERPL-SCNC: 23 MMOL/L (ref 21–32)
CREAT BLD-MCNC: 1.01 MG/DL (ref 0.55–1.02)
DEPRECATED RDW RBC AUTO: 47.7 FL (ref 35.1–46.3)
EOSINOPHIL # BLD AUTO: 0.03 X10(3) UL (ref 0–0.7)
EOSINOPHIL NFR BLD AUTO: 0.6 %
ERYTHROCYTE [DISTWIDTH] IN BLOOD BY AUTOMATED COUNT: 14.8 % (ref 11–15)
GASTRIN LEVEL: 56 PG/ML
GLOBULIN PLAS-MCNC: 3 G/DL (ref 2.8–4.4)
GLUCOSE BLD-MCNC: 82 MG/DL (ref 70–99)
HAV IGM SER QL: 1.7 MG/DL (ref 1.6–2.6)
HCT VFR BLD AUTO: 24.7 % (ref 35–48)
HGB BLD-MCNC: 8.2 G/DL (ref 12–16)
IMM GRANULOCYTES # BLD AUTO: 0.03 X10(3) UL (ref 0–1)
IMM GRANULOCYTES NFR BLD: 0.6 %
INR BLD: 0.96 (ref 0.9–1.2)
LYMPHOCYTES # BLD AUTO: 0.51 X10(3) UL (ref 1–4)
LYMPHOCYTES NFR BLD AUTO: 10.8 %
M PROTEIN MFR SERPL ELPH: 5.4 G/DL (ref 6.4–8.2)
MCH RBC QN AUTO: 29.2 PG (ref 26–34)
MCHC RBC AUTO-ENTMCNC: 33.2 G/DL (ref 31–37)
MCV RBC AUTO: 87.9 FL (ref 80–100)
MONOCYTES # BLD AUTO: 0.42 X10(3) UL (ref 0.1–1)
MONOCYTES NFR BLD AUTO: 8.9 %
NEUTROPHILS # BLD AUTO: 3.7 X10 (3) UL (ref 1.5–7.7)
NEUTROPHILS # BLD AUTO: 3.7 X10(3) UL (ref 1.5–7.7)
NEUTROPHILS NFR BLD AUTO: 78.3 %
OSMOLALITY SERPL CALC.SUM OF ELEC: 288 MOSM/KG (ref 275–295)
PLATELET # BLD AUTO: 207 10(3)UL (ref 150–450)
POTASSIUM SERPL-SCNC: 5 MMOL/L (ref 3.5–5.1)
PROTHROMBIN TIME: 12.6 SECONDS (ref 11.8–14.5)
RBC # BLD AUTO: 2.81 X10(6)UL (ref 3.8–5.3)
SODIUM SERPL-SCNC: 139 MMOL/L (ref 136–145)
WBC # BLD AUTO: 4.7 X10(3) UL (ref 4–11)

## 2020-06-27 PROCEDURE — 85610 PROTHROMBIN TIME: CPT | Performed by: HOSPITALIST

## 2020-06-27 PROCEDURE — 80053 COMPREHEN METABOLIC PANEL: CPT | Performed by: HOSPITALIST

## 2020-06-27 PROCEDURE — 85025 COMPLETE CBC W/AUTO DIFF WBC: CPT | Performed by: HOSPITALIST

## 2020-06-27 PROCEDURE — C9113 INJ PANTOPRAZOLE SODIUM, VIA: HCPCS | Performed by: INTERNAL MEDICINE

## 2020-06-27 PROCEDURE — 83735 ASSAY OF MAGNESIUM: CPT | Performed by: HOSPITALIST

## 2020-06-27 RX ORDER — MAGNESIUM OXIDE 400 MG (241.3 MG MAGNESIUM) TABLET
400 TABLET ONCE
Status: COMPLETED | OUTPATIENT
Start: 2020-06-27 | End: 2020-06-27

## 2020-06-27 RX ORDER — LOPERAMIDE HYDROCHLORIDE 2 MG/1
2 CAPSULE ORAL 4 TIMES DAILY PRN
Status: DISCONTINUED | OUTPATIENT
Start: 2020-06-27 | End: 2020-06-30

## 2020-06-27 NOTE — PROGRESS NOTES
[facility] Hospitalist Progress Note     CC: Hospital Follow up    PCP: Marita Greene MD       Assessment/Plan:     Principal Problem:    Hypokalemia  Active Problems:    ANGELA (acute kidney injury) ([facility] [address]    Failure to thrive in adult    Hyponatremia  Ms. Bolden resp. rate 18, height 4' 10\" (1.473 m), weight 127 lb 1.6 oz (57.7 kg), last menstrual period 04/01/2018, SpO2 99 %.     Temp:  [97.2 °F (36.2 °C)-98.3 °F (36.8 °C)] 98.3 °F (36.8 °C)  Pulse:  [] 102  Resp:  [15-18] 18  BP: ()/(64-90) 120/85 Abdomen+pelvis(cpt=74176)    Result Date: 6/24/2020  CONCLUSION:  1. Gastric wall thickening involving the fundus of the stomach. Suspect nonspecific gastritis. 2. Nondistended fluid-filled loops of small and large bowel.   Minimal induration in the paraco

## 2020-06-27 NOTE — PROGRESS NOTES
GI  PROGRESS NOTE    SUBJECTIVE:  Diarrhea persists but better vs yest.   Tolerating diet.      OBJECTIVE:  Temp:  [97.2 °F (36.2 °C)-98.8 °F (37.1 °C)] 98.1 °F (36.7 °C)  Pulse:  [] 87  Resp:  [15-18] 18  BP: ()/(64-90) 124/90  Exam  Gen: No cyclobenzaprine    _______________________________________________________________    IMPRESSION: Pt is a 53year old female who presents today for eval 3 months of watery diarrhea 15 - 20 x/d with urgency and ~ 40 lbs wt loss, severe hypokalemia, ANGELA, and

## 2020-06-27 NOTE — PLAN OF CARE
Pt alert and oriented. Uses her own cane to ambulate in the room. IVF discontinued today and PO fluids encouraged and tolerated. Pt still having a large number of stools, MD notified and imodium given PRN.  Plan is rehydration, rest, decreasing the stool am nausea and vomiting  Description  INTERVENTIONS:  - Maintain adequate hydration with IV or PO as ordered and tolerated  - Nasogastric tube to low intermittent suction as ordered  - Evaluate effectiveness of ordered antiemetic medications  - Provide nonphar as needed  6/27/2020 1410 by Kenji Ghosh  Outcome: Progressing  6/27/2020 1408 by Kenji Ghosh  Outcome: Progressing     Problem: Patient/Family Goals  Goal: Patient/Family Long Term Goal  Description  Patient's Long Term Goal: To go home    [address] [facility]

## 2020-06-27 NOTE — PLAN OF CARE
Problem: CARDIOVASCULAR - ADULT  Goal: Maintains optimal cardiac output and hemodynamic stability  Description  INTERVENTIONS:  - Monitor vital signs, rhythm, and trends  - Monitor for bleeding, hypotension and signs of decreased cardiac output  - Evalua Assess bowel function  - Maintain adequate hydration with IV or PO as ordered and tolerated  - Evaluate effectiveness of GI medications  - Encourage mobilization and activity  - Obtain nutritional consult as needed  - Establish a toileting routine/schedule attended. Kept observed.

## 2020-06-28 LAB
ANION GAP SERPL CALC-SCNC: 5 MMOL/L (ref 0–18)
BASOPHILS # BLD AUTO: 0.05 X10(3) UL (ref 0–0.2)
BASOPHILS NFR BLD AUTO: 1 %
BUN BLD-MCNC: 13 MG/DL (ref 7–18)
BUN/CREAT SERPL: 13.4 (ref 10–20)
CALCIUM BLD-MCNC: 8.8 MG/DL (ref 8.5–10.1)
CHLORIDE SERPL-SCNC: 111 MMOL/L (ref 98–112)
CO2 SERPL-SCNC: 24 MMOL/L (ref 21–32)
CREAT BLD-MCNC: 0.97 MG/DL (ref 0.55–1.02)
DEPRECATED RDW RBC AUTO: 47.8 FL (ref 35.1–46.3)
EOSINOPHIL # BLD AUTO: 0.16 X10(3) UL (ref 0–0.7)
EOSINOPHIL NFR BLD AUTO: 3.2 %
ERYTHROCYTE [DISTWIDTH] IN BLOOD BY AUTOMATED COUNT: 15 % (ref 11–15)
GLUCOSE BLD-MCNC: 77 MG/DL (ref 70–99)
HAV IGM SER QL: 1.7 MG/DL (ref 1.6–2.6)
HCT VFR BLD AUTO: 24.1 % (ref 35–48)
HGB BLD-MCNC: 8.1 G/DL (ref 12–16)
IMM GRANULOCYTES # BLD AUTO: 0.05 X10(3) UL (ref 0–1)
IMM GRANULOCYTES NFR BLD: 1 %
LYMPHOCYTES # BLD AUTO: 1.16 X10(3) UL (ref 1–4)
LYMPHOCYTES NFR BLD AUTO: 23.1 %
MCH RBC QN AUTO: 29.2 PG (ref 26–34)
MCHC RBC AUTO-ENTMCNC: 33.6 G/DL (ref 31–37)
MCV RBC AUTO: 87 FL (ref 80–100)
MONOCYTES # BLD AUTO: 0.56 X10(3) UL (ref 0.1–1)
MONOCYTES NFR BLD AUTO: 11.2 %
NEUTROPHILS # BLD AUTO: 3.04 X10 (3) UL (ref 1.5–7.7)
NEUTROPHILS # BLD AUTO: 3.04 X10(3) UL (ref 1.5–7.7)
NEUTROPHILS NFR BLD AUTO: 60.5 %
OSMOLALITY SERPL CALC.SUM OF ELEC: 289 MOSM/KG (ref 275–295)
PHOSPHATE SERPL-MCNC: 2.7 MG/DL (ref 2.5–4.9)
PLATELET # BLD AUTO: 196 10(3)UL (ref 150–450)
POTASSIUM SERPL-SCNC: 3.8 MMOL/L (ref 3.5–5.1)
RBC # BLD AUTO: 2.77 X10(6)UL (ref 3.8–5.3)
SODIUM SERPL-SCNC: 140 MMOL/L (ref 136–145)
WBC # BLD AUTO: 5 X10(3) UL (ref 4–11)

## 2020-06-28 PROCEDURE — 85025 COMPLETE CBC W/AUTO DIFF WBC: CPT | Performed by: HOSPITALIST

## 2020-06-28 PROCEDURE — 83735 ASSAY OF MAGNESIUM: CPT | Performed by: HOSPITALIST

## 2020-06-28 PROCEDURE — 84100 ASSAY OF PHOSPHORUS: CPT | Performed by: HOSPITALIST

## 2020-06-28 PROCEDURE — 80048 BASIC METABOLIC PNL TOTAL CA: CPT | Performed by: HOSPITALIST

## 2020-06-28 PROCEDURE — C9113 INJ PANTOPRAZOLE SODIUM, VIA: HCPCS | Performed by: INTERNAL MEDICINE

## 2020-06-28 RX ORDER — POTASSIUM CHLORIDE 20 MEQ/1
40 TABLET, EXTENDED RELEASE ORAL ONCE
Status: COMPLETED | OUTPATIENT
Start: 2020-06-28 | End: 2020-06-28

## 2020-06-28 RX ORDER — PANTOPRAZOLE SODIUM 40 MG/1
40 TABLET, DELAYED RELEASE ORAL
Status: DISCONTINUED | OUTPATIENT
Start: 2020-06-29 | End: 2020-06-30

## 2020-06-28 RX ORDER — MAGNESIUM OXIDE 400 MG (241.3 MG MAGNESIUM) TABLET
400 TABLET ONCE
Status: COMPLETED | OUTPATIENT
Start: 2020-06-28 | End: 2020-06-28

## 2020-06-28 NOTE — PLAN OF CARE
Patient sitting up in chair at shift change, VSS, on IV protonix and taking immodium stating \"I dont know if it helps me but it has cut down the amount of times I used the bathroom from 15 to 7. \" patient receiving subutex for pain sublingual. Self care. intermittent suction as ordered  - Evaluate effectiveness of ordered antiemetic medications  - Provide nonpharmacologic comfort measures as appropriate  - Advance diet as tolerated, if ordered  - Obtain nutritional consult as needed  - Evaluate fluid jeff Goal  Description  Patient's Short Term Goal: Stop going to the bathroom so much.      Interventions:   - PRN antidiarrheal as needed  -Pain medication as needed  -Rest and easy diet  - See additional Care Plan goals for specific interventions   Outcome: Pr

## 2020-06-28 NOTE — PROGRESS NOTES
GI  PROGRESS NOTE    SUBJECTIVE: overall, diarrhea much improved. But has had 8 BM's since midnight. Tolerating diet.     OBJECTIVE:  Temp:  [98.3 °F (36.8 °C)-99.3 °F (37.4 °C)] 98.3 °F (36.8 °C)  Pulse:  [] 106  Resp:  [14-18] 16  BP: (121-130)/ HFA, buprenorphine HCl, Dicyclomine HCl, traZODone HCl, cyclobenzaprine    _______________________________________________________________    IMPRESSION: Pt is a 53year old female who presents today for eval 3 months of watery diarrhea 15 - 20 x/d with ur

## 2020-06-28 NOTE — PLAN OF CARE
Pt alert and oriented. Pt is self care with cane. Still having several loose bowel movements. Imodium was given. Plan is to stay until Monday or Tuesday when biopsy results are in.      Problem: CARDIOVASCULAR - ADULT  Goal: Maintains optimal cardiac output ordered  - Obtain nutritional consult as needed  - Evaluate fluid balance  Outcome: Progressing  Goal: Maintains or returns to baseline bowel function  Description  INTERVENTIONS:  - Assess bowel function  - Maintain adequate hydration with IV or PO as ord additional Care Plan goals for specific interventions   Outcome: Progressing

## 2020-06-28 NOTE — PROGRESS NOTES
[facility] Hospitalist Progress Note     CC: Hospital Follow up    PCP: Sola Thorpe MD       Assessment/Plan:     Principal Problem:    Hypokalemia  Active Problems:    ANGELA (acute kidney injury) ([facility] [address]    Failure to thrive in adult    Hyponatremia  Ms. Bolden temperature source Oral, resp. rate 16, height 4' 10\" (1.473 m), weight 120 lb 11.2 oz (54.7 kg), last menstrual period 04/01/2018, SpO2 98 %.     Temp:  [98.3 °F (36.8 °C)-99.3 °F (37.4 °C)] 98.3 °F (36.8 °C)  Pulse:  [] 106  Resp:  [14-18] 16  BP: Meds:     • pantoprazole (PROTONIX) IV push  40 mg Intravenous Daily   • predniSONE  40 mg Oral Daily   • busPIRone HCl  15 mg Oral TID   • DULoxetine HCl  60 mg Oral BID   • triple antibiotic   Topical BID   • Heparin Sodium (Porcine)  5,000 Units S

## 2020-06-28 NOTE — PROGRESS NOTES
[facility] Pharmacy Note: Route Optimization for Pantoprazole (PROTONIX)    Patient is currently on Pantoprazole (PROTONIX) 40 mg IV every 24 hours.    The patient meets the criteria to convert to the oral equivalent as established by the IV to Oral conversion pro

## 2020-06-29 ENCOUNTER — APPOINTMENT (OUTPATIENT)
Dept: ULTRASOUND IMAGING | Facility: HOSPITAL | Age: 50
DRG: 391 | End: 2020-06-29
Attending: HOSPITALIST
Payer: MEDICAID

## 2020-06-29 ENCOUNTER — APPOINTMENT (OUTPATIENT)
Dept: CT IMAGING | Facility: HOSPITAL | Age: 50
DRG: 391 | End: 2020-06-29
Attending: HOSPITALIST
Payer: MEDICAID

## 2020-06-29 LAB
ANION GAP SERPL CALC-SCNC: 5 MMOL/L (ref 0–18)
BASOPHILS # BLD AUTO: 0.06 X10(3) UL (ref 0–0.2)
BASOPHILS NFR BLD AUTO: 1.2 %
BUN BLD-MCNC: 16 MG/DL (ref 7–18)
BUN/CREAT SERPL: 17 (ref 10–20)
CALCIUM BLD-MCNC: 9.1 MG/DL (ref 8.5–10.1)
CHLORIDE SERPL-SCNC: 109 MMOL/L (ref 98–112)
CO2 SERPL-SCNC: 24 MMOL/L (ref 21–32)
CREAT BLD-MCNC: 0.94 MG/DL (ref 0.55–1.02)
D DIMER PPP FEU-MCNC: 0.81 UG/ML FEU (ref ?–0.5)
DEPRECATED RDW RBC AUTO: 49 FL (ref 35.1–46.3)
EOSINOPHIL # BLD AUTO: 0.14 X10(3) UL (ref 0–0.7)
EOSINOPHIL NFR BLD AUTO: 2.8 %
ERYTHROCYTE [DISTWIDTH] IN BLOOD BY AUTOMATED COUNT: 15.1 % (ref 11–15)
GLUCOSE BLD-MCNC: 68 MG/DL (ref 70–99)
GLUCOSE BLDC GLUCOMTR-MCNC: 83 MG/DL (ref 70–99)
HAV IGM SER QL: 1.5 MG/DL (ref 1.6–2.6)
HCT VFR BLD AUTO: 24.8 % (ref 35–48)
HGB BLD-MCNC: 8.1 G/DL (ref 12–16)
IMM GRANULOCYTES # BLD AUTO: 0.05 X10(3) UL (ref 0–1)
IMM GRANULOCYTES NFR BLD: 1 %
LYMPHOCYTES # BLD AUTO: 1.27 X10(3) UL (ref 1–4)
LYMPHOCYTES NFR BLD AUTO: 25.2 %
MCH RBC QN AUTO: 28.8 PG (ref 26–34)
MCHC RBC AUTO-ENTMCNC: 32.7 G/DL (ref 31–37)
MCV RBC AUTO: 88.3 FL (ref 80–100)
MONOCYTES # BLD AUTO: 0.68 X10(3) UL (ref 0.1–1)
MONOCYTES NFR BLD AUTO: 13.5 %
NEUTROPHILS # BLD AUTO: 2.83 X10 (3) UL (ref 1.5–7.7)
NEUTROPHILS # BLD AUTO: 2.83 X10(3) UL (ref 1.5–7.7)
NEUTROPHILS NFR BLD AUTO: 56.3 %
NT-PROBNP SERPL-MCNC: 6297 PG/ML (ref ?–125)
OSMOLALITY SERPL CALC.SUM OF ELEC: 285 MOSM/KG (ref 275–295)
PLATELET # BLD AUTO: 203 10(3)UL (ref 150–450)
POTASSIUM SERPL-SCNC: 4.4 MMOL/L (ref 3.5–5.1)
RBC # BLD AUTO: 2.81 X10(6)UL (ref 3.8–5.3)
SODIUM SERPL-SCNC: 138 MMOL/L (ref 136–145)
WBC # BLD AUTO: 5 X10(3) UL (ref 4–11)

## 2020-06-29 PROCEDURE — 82962 GLUCOSE BLOOD TEST: CPT

## 2020-06-29 PROCEDURE — 80048 BASIC METABOLIC PNL TOTAL CA: CPT | Performed by: HOSPITALIST

## 2020-06-29 PROCEDURE — 93970 EXTREMITY STUDY: CPT | Performed by: HOSPITALIST

## 2020-06-29 PROCEDURE — 71260 CT THORAX DX C+: CPT | Performed by: HOSPITALIST

## 2020-06-29 PROCEDURE — 85379 FIBRIN DEGRADATION QUANT: CPT | Performed by: HOSPITALIST

## 2020-06-29 PROCEDURE — 85025 COMPLETE CBC W/AUTO DIFF WBC: CPT | Performed by: HOSPITALIST

## 2020-06-29 PROCEDURE — 83880 ASSAY OF NATRIURETIC PEPTIDE: CPT | Performed by: HOSPITALIST

## 2020-06-29 PROCEDURE — 83735 ASSAY OF MAGNESIUM: CPT | Performed by: HOSPITALIST

## 2020-06-29 RX ORDER — MELATONIN
325 2 TIMES DAILY WITH MEALS
Status: DISCONTINUED | OUTPATIENT
Start: 2020-06-29 | End: 2020-06-30

## 2020-06-29 RX ORDER — FAMOTIDINE 20 MG/1
20 TABLET ORAL ONCE
Status: DISCONTINUED | OUTPATIENT
Start: 2020-06-29 | End: 2020-06-30

## 2020-06-29 RX ORDER — CALCIUM CARBONATE 200(500)MG
500 TABLET,CHEWABLE ORAL 3 TIMES DAILY PRN
Status: DISCONTINUED | OUTPATIENT
Start: 2020-06-29 | End: 2020-06-30

## 2020-06-29 RX ORDER — MAGNESIUM OXIDE 400 MG (241.3 MG MAGNESIUM) TABLET
800 TABLET ONCE
Status: DISCONTINUED | OUTPATIENT
Start: 2020-06-29 | End: 2020-06-29

## 2020-06-29 RX ORDER — MAGNESIUM SULFATE 1 G/100ML
1 INJECTION INTRAVENOUS ONCE
Status: COMPLETED | OUTPATIENT
Start: 2020-06-29 | End: 2020-06-29

## 2020-06-29 RX ORDER — FAMOTIDINE 20 MG/1
40 TABLET ORAL ONCE
Status: COMPLETED | OUTPATIENT
Start: 2020-06-29 | End: 2020-06-29

## 2020-06-29 NOTE — PAYOR COMM NOTE
--------------  CONTINUED STAY REVIEW    Payor: Mani Morris #:  [id number]  Authorization Number: [id number]    Admit date: 6/24/20  Admit time: [address] [place]    Admitting Physician: Caity Christianson MD  Attending Physician:  Dhara Keys continue home suboxone     FN:  - IVF: none  - Diet: adv diet     DVT Prophy: SCD, HSQ  Lines: PIV     Dispo: pending clinical course     Questions/concerns were discussed with patient and/or family by bedside.     Thank You,  Carmita Lomax MD     [facility] 06/27/20  0526 06/28/20  0521 06/29/20  0506   GLU 82 77 68*   BUN 15 13 16   CREATSERUM 1.01 0.97 0.94   GFRAA 76 79 82   GFRNAA 66 69 71   CA 8.1* 8.8 9.1    140 138   K 5.0 3.8 4.4    111 109   CO2 23.0 24.0 24.0               Recent L Myrna Melo, RN    6/28/2020 2335 Given 5 mg Oral Rick Trevino, RN    6/28/2020 1659 Given 5 mg Oral [place] Pellet      Dicyclomine HCl (BENTYL) cap 10 mg     Date Action Dose Route User    6/29/2020 0857 Given 10 mg Oral Flori Hightower, CEDRIC 6/29/2020 0856 Given 40 mg Oral KatjaFlori RN      predniSONE (DELTASONE) tab 40 mg     Date Action Dose Route User    6/29/2020 0833 Given 40 mg Oral KatjaFlori, RN      topiramate (Topamax) tab 25 mg     Date Action Dose Route User

## 2020-06-29 NOTE — CONSULTS
[facility]    PATIENT'S NAME: BENTLEY JESUS   ATTENDING PHYSICIAN: Dionicio Vaughan MD   CONSULTING PHYSICIAN: Naren Carvalho MD   PATIENT ACCOUNT#:   [id number]    LOCATION:  [address] [place] [address] #:   [id number]       DATE OF BIRTH:  0 SIGNS:  Heart rate 130, respiratory rate 20, blood pressure 119/94, temperature 98.3 degrees Fahrenheit. HEENT:  Sclerae anicteric. Head normocephalic  NECK:  Supple. LUNGS:  Clear to auscultation. HEART:  Tachycardic and regular. There is no S3.   ABD 13:51:13  t: 06/29/2020 15:14:27  Job [id number]  JLW/

## 2020-06-29 NOTE — PAYOR COMM NOTE
--------------  CONTINUED STAY REVIEW    Payor: Mani Morris #:  [id number]  Authorization Number: [id number]    Admit date: 6/24/20  Admit time: [address] [place]    Admitting Physician: Tyler Vega MD  Attending Physician:  Norman Nick (Porcine)  5,000 Units Subcutaneous 2 times per day   • folic acid  2 mg Oral Daily   • cetirizine  5 mg Oral Daily   • Montelukast Sodium  10 mg Oral Nightly   • topiramate  25 mg Oral Daily   • leflunomide  20 mg Oral Daily      Loperamide HCl, diphenhyd CHF who presented with 3 month history of nausea/vomiting/diarrhea, 40 lb weight loss and severe weakness.     Pancolitis / IBD vs Microscopic colitis / gastritis / Nausea/vomiting/diarrhea/weight loss   - 3 months history of nonbloody N/V/diarrhea  - 40 l 6/27/2020 1219  Last data filed at 6/27/2020 1020      Gross per 24 hour   Intake 1645 ml   Output 950 ml   Net 695 ml         Last 3 Weights  06/27/20 0405 : 127 lb 1.6 oz (57.7 kg)  06/26/20 0401 : 125 lb 9.6 oz (57 kg)  06/25/20 0626 : 119 lb 9.6 oz (54 nonspecific enterocolitis. Nonvisualized appendix. 3. Trace pericardial effusion measuring 6 mm at the base of the heart anteriorly. 4. No hydroureteronephrosis or urinary calculi.  5. Marked S shaped scoliosis and superimposed advanced multilevel dorsal Date Action Dose Route User    6/28/2020 2335 Given 5 mg Oral Xin Millard, RN    6/28/2020 1659 Given 5 mg Oral Jeanann Goodman    6/28/2020 4547 Given 5 mg Oral Jeanann Goodman      Dicyclomine HCl (BENTYL) cap 10 mg     Date Action Dose Route User topiramate (Topamax) tab 25 mg     Date Action Dose Route User    6/28/2020 0811 Given 25 mg Oral Gabino Herrmann      triple antibiotic (NEOSPORIN) 3.5-400-5000 ointment     Date Action Dose Route User    6/28/2020 2026 Given (none) Topical Onalee Quarry

## 2020-06-29 NOTE — PROGRESS NOTES
[facility] Hospitalist Progress Note     CC: Hospital Follow up    PCP: Ilene Gaming MD       Assessment/Plan:     Principal Problem:    Hypokalemia  Active Problems:    ANGELA (acute kidney injury) ([facility] [address]    Failure to thrive in adult    Hyponatremia  Ms. Bolden MD [facility]     Subjective:     Mild sob, and lower ext edema diarrhea persists but slowing down, denies abd pain, no blood in stool    OBJECTIVE:    Blood pressure (!) 119/94, pulse (!) 130, temperature 98.3 °F (36.8 °C), temperature source Oral 06/24/20  1503 06/27/20  0526   ALT 50  --  51   AST 43*  --  27   ALB 3.2*  --  2.4*   LDH  --  404*  --          Imaging:          Meds:     • ferrous sulfate  325 mg Oral BID with meals   • Pantoprazole Sodium  40 mg Oral QAM AC   • predniSONE  40 mg Or

## 2020-06-29 NOTE — PLAN OF CARE
Patient resting well overnight. No complaints of pain. Continues to have loose/watery stool. Will continue to monitor. 8074- per lab am blood sugar 68. Fingerstick recheck 83.      Problem: CARDIOVASCULAR - ADULT  Goal: Maintains optimal cardiac output ordered  - Obtain nutritional consult as needed  - Evaluate fluid balance  Outcome: Progressing     Problem: METABOLIC/FLUID AND ELECTROLYTES - ADULT  Goal: Glucose maintained within prescribed range  Description  INTERVENTIONS:  - Monitor Blood Glucose as

## 2020-06-29 NOTE — PLAN OF CARE
Pt received awake and alert. Able to ambulate independently with cane. Continues to have frequent loose stools. Noted to be tachycardic, up to 150s, when ambulating. MD aware. Orthostatics done. Mg replaced. Educated to call for assistance.  Call light with Progressing     Problem: METABOLIC/FLUID AND ELECTROLYTES - ADULT  Goal: Glucose maintained within prescribed range  Description  INTERVENTIONS:  - Monitor Blood Glucose as ordered  - Assess for signs and symptoms of hyperglycemia and hypoglycemia  - Admin temperature  - Assess for signs of decreased coronary artery perfusion - ex.  Angina  - Evaluate fluid balance, assess for edema, trend weights  Outcome: Not Progressing

## 2020-06-29 NOTE — PROGRESS NOTES
GI  PROGRESS NOTE    SUBJECTIVE: improving diarrhea, tolerating diet     OBJECTIVE:  Temp:  [98.3 °F (36.8 °C)-99 °F (37.2 °C)] 98.3 °F (36.8 °C)  Pulse:  [] 130  Resp:  [20] 20  BP: (119-136)/(85-94) 119/94  Exam  Gen: No acute distress, alert and buprenorphine HCl, Dicyclomine HCl, traZODone HCl, cyclobenzaprine    _______________________________________________________________    IMPRESSION/PLAN: Pt is a 53year old female who presents for eval 3 months of watery diarrhea 15 - 20 x/d with urgency

## 2020-06-30 ENCOUNTER — APPOINTMENT (OUTPATIENT)
Dept: CV DIAGNOSTICS | Facility: HOSPITAL | Age: 50
DRG: 391 | End: 2020-06-30
Attending: HOSPITALIST
Payer: MEDICAID

## 2020-06-30 VITALS
BODY MASS INDEX: 27.29 KG/M2 | OXYGEN SATURATION: 97 % | DIASTOLIC BLOOD PRESSURE: 88 MMHG | HEIGHT: 58 IN | RESPIRATION RATE: 20 BRPM | WEIGHT: 130 LBS | HEART RATE: 98 BPM | SYSTOLIC BLOOD PRESSURE: 119 MMHG | TEMPERATURE: 99 F

## 2020-06-30 LAB
ANION GAP SERPL CALC-SCNC: 4 MMOL/L (ref 0–18)
BASOPHILS # BLD AUTO: 0.05 X10(3) UL (ref 0–0.2)
BASOPHILS NFR BLD AUTO: 1 %
BUN BLD-MCNC: 18 MG/DL (ref 7–18)
BUN/CREAT SERPL: 22.5 (ref 10–20)
CALCIUM BLD-MCNC: 8.5 MG/DL (ref 8.5–10.1)
CHLORIDE SERPL-SCNC: 110 MMOL/L (ref 98–112)
CO2 SERPL-SCNC: 25 MMOL/L (ref 21–32)
CREAT BLD-MCNC: 0.8 MG/DL (ref 0.55–1.02)
DEPRECATED RDW RBC AUTO: 47.8 FL (ref 35.1–46.3)
EOSINOPHIL # BLD AUTO: 0.07 X10(3) UL (ref 0–0.7)
EOSINOPHIL NFR BLD AUTO: 1.4 %
ERYTHROCYTE [DISTWIDTH] IN BLOOD BY AUTOMATED COUNT: 15.2 % (ref 11–15)
GLUCOSE BLD-MCNC: 70 MG/DL (ref 70–99)
HAV IGM SER QL: 1.8 MG/DL (ref 1.6–2.6)
HCT VFR BLD AUTO: 22.7 % (ref 35–48)
HCT VFR BLD AUTO: 25.3 % (ref 35–48)
HGB BLD-MCNC: 7.6 G/DL (ref 12–16)
HGB BLD-MCNC: 8.4 G/DL (ref 12–16)
IMM GRANULOCYTES # BLD AUTO: 0.03 X10(3) UL (ref 0–1)
IMM GRANULOCYTES NFR BLD: 0.6 %
LYMPHOCYTES # BLD AUTO: 1.22 X10(3) UL (ref 1–4)
LYMPHOCYTES NFR BLD AUTO: 23.6 %
MCH RBC QN AUTO: 29.2 PG (ref 26–34)
MCHC RBC AUTO-ENTMCNC: 33.5 G/DL (ref 31–37)
MCV RBC AUTO: 87.3 FL (ref 80–100)
MONOCYTES # BLD AUTO: 0.7 X10(3) UL (ref 0.1–1)
MONOCYTES NFR BLD AUTO: 13.5 %
NEUTROPHILS # BLD AUTO: 3.11 X10 (3) UL (ref 1.5–7.7)
NEUTROPHILS # BLD AUTO: 3.11 X10(3) UL (ref 1.5–7.7)
NEUTROPHILS NFR BLD AUTO: 59.9 %
OSMOLALITY SERPL CALC.SUM OF ELEC: 288 MOSM/KG (ref 275–295)
PLATELET # BLD AUTO: 210 10(3)UL (ref 150–450)
POTASSIUM SERPL-SCNC: 4.2 MMOL/L (ref 3.5–5.1)
RBC # BLD AUTO: 2.6 X10(6)UL (ref 3.8–5.3)
SODIUM SERPL-SCNC: 139 MMOL/L (ref 136–145)
WBC # BLD AUTO: 5.2 X10(3) UL (ref 4–11)

## 2020-06-30 PROCEDURE — 93306 TTE W/DOPPLER COMPLETE: CPT | Performed by: HOSPITALIST

## 2020-06-30 PROCEDURE — 80048 BASIC METABOLIC PNL TOTAL CA: CPT | Performed by: HOSPITALIST

## 2020-06-30 PROCEDURE — 85014 HEMATOCRIT: CPT | Performed by: HOSPITALIST

## 2020-06-30 PROCEDURE — 85025 COMPLETE CBC W/AUTO DIFF WBC: CPT | Performed by: HOSPITALIST

## 2020-06-30 PROCEDURE — 85018 HEMOGLOBIN: CPT | Performed by: HOSPITALIST

## 2020-06-30 PROCEDURE — 83735 ASSAY OF MAGNESIUM: CPT | Performed by: HOSPITALIST

## 2020-06-30 RX ORDER — METOPROLOL TARTRATE 50 MG/1
50 TABLET, FILM COATED ORAL
Status: DISCONTINUED | OUTPATIENT
Start: 2020-06-30 | End: 2020-06-30

## 2020-06-30 RX ORDER — MELATONIN
325 2 TIMES DAILY WITH MEALS
Qty: 60 TABLET | Refills: 0 | Status: SHIPPED | OUTPATIENT
Start: 2020-06-30 | End: 2020-10-02 | Stop reason: ALTCHOICE

## 2020-06-30 RX ORDER — MAGNESIUM OXIDE 400 MG (241.3 MG MAGNESIUM) TABLET
400 TABLET ONCE
Status: COMPLETED | OUTPATIENT
Start: 2020-06-30 | End: 2020-06-30

## 2020-06-30 RX ORDER — BUDESONIDE 9 MG/1
1 TABLET, FILM COATED, EXTENDED RELEASE ORAL DAILY
Qty: 42 TABLET | Refills: 0 | Status: SHIPPED | OUTPATIENT
Start: 2020-06-30 | End: 2020-07-01

## 2020-06-30 NOTE — PLAN OF CARE
Problem: CARDIOVASCULAR - ADULT  Goal: Maintains optimal cardiac output and hemodynamic stability  Description  INTERVENTIONS:  - Monitor vital signs, rhythm, and trends  - Monitor for bleeding, hypotension and signs of decreased cardiac output  - Evalua Assess bowel function  - Maintain adequate hydration with IV or PO as ordered and tolerated  - Evaluate effectiveness of GI medications  - Encourage mobilization and activity  - Obtain nutritional consult as needed  - Establish a toileting routine/schedule of care  Description  Interventions:  - What would you like us to know as we care for you?  I have had this diarrhea for a long time  - Provide timely, complete, and accurate information to patient/family  - Incorporate patient and family knowledge, values,

## 2020-06-30 NOTE — PAYOR COMM NOTE
--------------  CONTINUED STAY REVIEW    Payor: Mani Morris #:  [id number]  Authorization Number: [id number]    Admit date: 6/24/20  Admit time: [address] [place]    Admitting Physician: Fredy Shafer MD  Attending Physician:  Khanh Cueva 4620  Last data filed at 6/30/2020 0543      Gross per 24 hour   Intake 1920 ml   Output 3650 ml   Net -1730 ml      Wt Readings from Last 3 Encounters:  06/29/20 : 130 lb (59 kg)  06/10/20 : 120 lb (54.4 kg)  06/01/20 : 125 lb (56.7 kg)             Genera Nightly  topiramate (Topamax) tab 25 mg, 25 mg, Oral, Daily  traZODone HCl (DESYREL) tab 50 mg, 50 mg, Oral, Nightly PRN  cyclobenzaprine (FLEXERIL) tab 5 mg, 5 mg, Oral, TID PRN           Laboratory Data:           Telemetry: SR              Lab Results Action Dose Route User    6/30/2020 0905 Given 5 mg Oral Riana Drilling, RN      cyclobenzaprine (FLEXERIL) tab 5 mg     Date Action Dose Route User    6/30/2020 0905 Given 5 mg Oral Riana Bennetting, RN    6/29/2020 2236 Given 5 mg Oral Hernandez Sandoval, RN RN      metoprolol Tartrate (LOPRESSOR) tab 25 mg     Date Action Dose Route User    6/30/2020 0549 Given 25 mg Oral Wilfrido Donaldson RN    6/29/2020 1652 Given 25 mg Oral KatjaFlori lugo RN      Montelukast Sodium (SINGULAIR) tab 10 mg     Date Action

## 2020-06-30 NOTE — PROGRESS NOTES
[address] [facility] Cardiology  Progress Note    [place] Patient Status:  Inpatient    1970 MRN [id number]   Location [facility] 3W/SW Attending Dalia Jo MD   Hosp Day # 6 PCP Alondra Tinajero MD     Impression:  IMPRESSION:    1. Wt Readings from Last 3 Encounters:  06/29/20 : 130 lb (59 kg)  06/10/20 : 120 lb (54.4 kg)  06/01/20 : 125 lb (56.7 kg)          General: Awake and alert; in no acute distress  Neck: Supple  Cardiac: Regular rate and regular rhythm; no murmurs  Lungs: PRN  cyclobenzaprine (FLEXERIL) tab 5 mg, 5 mg, Oral, TID PRN        Laboratory Data:        Telemetry: SR      Lab Results   Component Value Date    WBC 5.2 06/30/2020    HGB 7.6 06/30/2020    HCT 22.7 06/30/2020    .0 06/30/2020     Lab Results

## 2020-06-30 NOTE — PROGRESS NOTES
GI  PROGRESS NOTE    SUBJECTIVE: improving, tolerating diet , path notable for collagenous colitis     OBJECTIVE:  Temp:  [98.3 °F (36.8 °C)-98.5 °F (36.9 °C)] 98.5 °F (36.9 °C)  Pulse:  [] 98  Resp:  [20] 20  BP: (115-128)/(84-91) 119/88  Exam  Ge ondansetron HCl, Metoclopramide HCl, Albuterol Sulfate HFA, buprenorphine HCl, Dicyclomine HCl, traZODone HCl, cyclobenzaprine    ______________    . Duodenum; biopsies:   · Fragments of small intestinal/duodenal mucosa with preserved villous architecture.

## 2020-06-30 NOTE — PLAN OF CARE
Patient had heartburn early into this writers shift. Patient stated she thought it was related to her lunch, that contained onions. Orders were received from one time dose of prilosec. Patient felt some relief.  This episode has patient concerned about retu to low intermittent suction as ordered  - Evaluate effectiveness of ordered antiemetic medications  - Provide nonpharmacologic comfort measures as appropriate  - Advance diet as tolerated, if ordered  - Obtain nutritional consult as needed  - Evaluate flui

## 2020-06-30 NOTE — DISCHARGE SUMMARY
General Medicine Discharge Summary     Patient ID:  Mecca Barclay  53year old  4/13/1970    Admit date: 6/24/2020    Discharge date and time: 6/30/2020    Attending Physician: Damir Patricia MD     Consults: IP CONSULT TO HOSPITALIST  IP CONSULT TO Charlie Thomas related, started on steroids, plan for 6 weeks of treatment, plan for close GI and cards follow up.       Pancolitis / IBD vs Microscopic colitis / gastritis / Nausea/vomiting/diarrhea/weight loss   - 3 months history of nonbloody N/V/diarrhea  - 40 lb jeremy left basilar ground-glass density airspace disease appears resolved, compatible with a resolved infectious/inflammatory process (such as aspiration). 3. Trace secretions and/or debris throughout the tracheobronchial tree.  4. Heterogeneous 1.4 cm right thyr TABLET BY MOUTH DAILY     folic acid 1 MG Tabs  Commonly known as:  FOLVITE  Take 2 tablets (2 mg total) by mouth daily.      furosemide 20 MG Tabs  Commonly known as:  LASIX  TAKE ONE TABLET BY MOUTH TWICE DAILY AS NEEDED     Levocetirizine Dihydrochloride [address]             Luiz Anguiano MD. Schedule an appointment as soon as possible for a visit in 3 weeks.     Specialty:  GASTROENTEROLOGY  Contact information:  [facility]

## 2020-07-01 NOTE — PAYOR COMM NOTE
--------------  DISCHARGE REVIEW    Payor: Mani Morris #:  [id number]  Authorization Number: [id number]    Admit date: 6/24/20  Admit time:  0699  Discharge Date: 6/30/2020  3:43 PM     Admitting Physician: Louie Metcalf RA, HTN, HL, chronic back pain on suboxone, diastolic CHF who presented with weakness. Patient has been having nausea/vomiting/diarrhea and 40 lb weight loss over the past 3 months. Felt very weak, worse in the past week.  Called PCP because she felt like s pain on suboxone  - continue home suboxone        Operative Procedures: Procedure(s) (LRB):  COLONOSCOPY (N/A)  ESOPHAGOGASTRODUODENOSCOPY (EGD) (N/A)     Imaging: Us Venous Doppler Leg Bilat - Diag Img (cpt=93970)    Result Date: 6/29/2020  CONCLUSION: Base) MCG/ACT Aers  Commonly known as:  Ventolin HFA  INHALE 2 PUFFS INTO THE LUNGS EVERY 4 HOURS AS NEEDED FOR WHEEZING     Buprenorphine HCl-Naloxone HCl 8-2 MG Subl  Commonly known as:  SUBOXONE  Place 1 tablet under the tongue 3 (three) times daily as gabapentin 600 MG Tabs  Commonly known as:  NEURONTIN     gabapentin 800 MG Tabs  Commonly known as:  NEURONTIN     leflunomide 20 MG Tabs  Commonly known as:  ARAVA     lisinopril 40 MG Tabs     PEG 3350-KCl-Na Bicarb-NaCl 420 g Solr  Commonly known as:

## 2020-07-08 NOTE — CDS QUERY
Dr. Pickering Listen To answer this query:   1. Click \"Edit\" button on the toolbar. 2. Type an \"X\" in the bracket for diagnosis(s) that apply. (You may also add additional clinical details as you feel necessary to substantiate your response.)  3.  Click on \"SIGN\"

## 2021-09-27 ENCOUNTER — LAB ENCOUNTER (OUTPATIENT)
Dept: LAB | Age: 51
End: 2021-09-27
Attending: INTERNAL MEDICINE
Payer: MEDICAID

## 2021-09-27 DIAGNOSIS — M05.79 RHEUMATOID ARTHRITIS INVOLVING MULTIPLE SITES WITH POSITIVE RHEUMATOID FACTOR (HCC): ICD-10-CM

## 2021-09-27 PROCEDURE — 36415 COLL VENOUS BLD VENIPUNCTURE: CPT

## 2021-09-27 PROCEDURE — 80076 HEPATIC FUNCTION PANEL: CPT

## 2021-09-27 PROCEDURE — 82565 ASSAY OF CREATININE: CPT

## 2021-09-27 PROCEDURE — 85025 COMPLETE CBC W/AUTO DIFF WBC: CPT

## 2021-09-27 PROCEDURE — 84520 ASSAY OF UREA NITROGEN: CPT

## 2021-12-08 ENCOUNTER — HOSPITAL ENCOUNTER (OUTPATIENT)
Dept: GENERAL RADIOLOGY | Facility: HOSPITAL | Age: 51
Discharge: HOME | End: 2021-12-08
Attending: PAIN MEDICINE
Payer: MEDICAID

## 2021-12-08 DIAGNOSIS — M51.36 DDD (DEGENERATIVE DISC DISEASE), LUMBAR: ICD-10-CM

## 2021-12-08 DIAGNOSIS — G89.4 CHRONIC PAIN SYNDROME: ICD-10-CM

## 2021-12-08 DIAGNOSIS — M48.061 SPINAL STENOSIS, LUMBAR REGION, WITHOUT NEUROGENIC CLAUDICATION: ICD-10-CM

## 2021-12-08 PROCEDURE — 73502 X-RAY EXAM HIP UNI 2-3 VIEWS: CPT | Performed by: PAIN MEDICINE

## 2021-12-09 NOTE — PROGRESS NOTES
Severe narrowing of the superior joint space of the left right hip with lateral displacement of the left femoral head in relation to the acetabulum.  Questionable subarticular lucency and moderate sclerosis of the left femoral head raising the question of

## 2025-04-01 NOTE — DIETARY NOTE
ADULT NUTRITION REASSESSMENT    Pt is at high nutrition risk. Pt meets severe malnutrition criteria.       CRITERIA FOR MALNUTRITION DIAGNOSIS:  Criteria for severe malnutrition diagnosis: chronic illness related to wt loss greater than 10% in 6 months and The patient is Stable - Low risk of patient condition declining or worsening    Shift Goals  Clinical Goals: Monitor VS and labs; safety from fall ; q4 neuro check  Patient Goals: get better  Family Goals: TIMOTEO    Progress made toward(s) clinical / shift goals:    Problem: Knowledge Deficit - Standard  Goal: Patient and family/care givers will demonstrate understanding of plan of care, disease process/condition, diagnostic tests and medications  Outcome: Progressing     Problem: Optimal Care of the Stroke Patient  Goal: Optimal emergency care for the stroke patient  Outcome: Progressing     Problem: Knowledge Deficit - Stroke Education  Goal: Patient's knowledge of stroke and risk factors will improve  Outcome: Progressing     Problem: Psychosocial - Patient Condition  Goal: Patient's ability to verbalize feelings about condition will improve  Outcome: Progressing     Problem: Discharge Planning - Stroke  Goal: Ensure Stroke Core Measures are met prior to discharge  Outcome: Progressing     Problem: Neuro Status  Goal: Neuro status will remain stable or improve  Outcome: Progressing     Problem: Hemodynamic Monitoring  Goal: Patient's hemodynamics, fluid balance and neurologic status will be stable or improve  Outcome: Progressing     Problem: Respiratory - Stroke Patient  Goal: Patient will achieve/maintain optimum respiratory rate/effort  Outcome: Progressing     Problem: Dysphagia  Goal: Dysphagia will improve  Outcome: Progressing     Problem: Risk for Aspiration  Goal: Patient's risk for aspiration will be absent or decrease  Outcome: Progressing       Patient is not progressing towards the following goals:       supplements: iron and folic acid  - Feeding assistance: independent  - Nutrition education: assess education needs  - Coordination of nutrition care: collaboration with other providers  - Discharge and transfer of nutrition care to new setting or provider: supplements to maximize  Food Allergies: No  Cultural/Ethnic/Yarsanism Preferences: Not Obtained    MEDICATIONS: reviewed     • ferrous sulfate  325 mg Oral BID with meals   • metoprolol tartrate  25 mg Oral 2x Daily(Beta Blocker)   • Pantoprazole Sodium status  Tonja Lozano RDN, LDN  Clinical Nutrition  Ext [phone]

## (undated) DEVICE — FORCEP RADIAL JAW 4

## (undated) DEVICE — 6 ML SYRINGE LUER-LOCK TIP: Brand: MONOJECT

## (undated) DEVICE — Device: Brand: CUSTOM PROCEDURE KIT

## (undated) DEVICE — MEDI-VAC NON-CONDUCTIVE SUCTION TUBING 6MM X 1.8M (6FT.) L: Brand: CARDINAL HEALTH

## (undated) DEVICE — CONMED SCOPE SAVER BITE BLOCK, 20X27 MM: Brand: SCOPE SAVER

## (undated) DEVICE — 35 ML SYRINGE REGULAR TIP: Brand: MONOJECT

## (undated) DEVICE — LINE MNTR ADLT SET O2 INTMD

## (undated) DEVICE — 3 ML SYRINGE LUER-LOCK TIP: Brand: MONOJECT

## (undated) DEVICE — Device: Brand: DEFENDO AIR/WATER/SUCTION AND BIOPSY VALVE

## (undated) DEVICE — YANKAUER SUCTION INSTRUMENT NO CONTROL VENT, BULB TIP, CLEAR: Brand: YANKAUER

## (undated) NOTE — LETTER
[address], [place]  Authorization for Invasive Procedures  1.  I hereby authorize Reyna Meyer , my physician and whomever may be designated as the doctor's assistant, to perform the following operation and/or procedure:   Esophag following are some, but not all, of the potential risks that can occur: fever and allergic reactions, hemolytic reactions, transmission of disease such as hepatitis, AIDS, cytomegalovirus (CMV), and flluid overload.  In the event that I wish to have autolog administration of sedation/analgesia as may be necessary or desirable in the judgment of my physician.      Signature of Patient:  ________________________________________________ Date: _________Time: _________    Responsible person in case of minor or unco

## (undated) NOTE — ED AVS SNAPSHOT
Renetta Fang   MRN: [id number]    Department:  [facility] Emergency Department   Date of Visit:  5/14/2018           Disclosure     Insurance plans vary and the physician(s) referred by the ER may not be covered by your plan.  Please contact yo CARE PHYSICIAN AT ONCE OR RETURN IMMEDIATELY TO THE EMERGENCY DEPARTMENT. If you have been prescribed any medication(s), please fill your prescription right away and begin taking the medication(s) as directed.   If you believe that any of the medications

## (undated) NOTE — LETTER
[facility]  Administration of Anesthesia  1. Molly Expose, or _________________________________ acting on her behalf, (Patient) (Dependent/Representative) request to receive anesthesia for my pending procedure/operation/treatment.   ARVIN p infections, high spinal block, spinal bleeding, seizure, cardiac arrest and death. 7. AWARENESS: I understand that it is possible (but unlikely) to have explicit memory of events from the operating room while under general anesthesia.   8. ELECTROCONVULSIV unconscious pt /Relationship    My signature below affirms that prior to the time of the procedure, I have explained to the patient and/or his/her guardian, the risks and benefits of undergoing anesthesia, as well as any reasonable alternatives.     _______

## (undated) NOTE — LETTER
[address], [place]  Authorization for Invasive Procedures  1.  I hereby authorize Dr. Racquel Marsh , my physician and whomever may be designated as the doctor's assistant, to perform the following operation and/or procedure:  *** on Donna fever and allergic reactions, hemolytic reactions, transmission of disease such as hepatitis, AIDS, cytomegalovirus (CMV), and flluid overload.  In the event that I wish to have autologous transfusions of my own blood, or a directed donor transfusion, I serina Signature of Patient:  ________________________________________________ Date: _________Time: _________    Responsible person in case of minor or unconscious: _____________________________Relationship: ____________     Witness Signature: ___________________